# Patient Record
Sex: MALE | Race: WHITE | NOT HISPANIC OR LATINO | Employment: FULL TIME | URBAN - NONMETROPOLITAN AREA
[De-identification: names, ages, dates, MRNs, and addresses within clinical notes are randomized per-mention and may not be internally consistent; named-entity substitution may affect disease eponyms.]

---

## 2017-12-20 ENCOUNTER — OFFICE VISIT (OUTPATIENT)
Dept: RETAIL CLINIC | Facility: CLINIC | Age: 32
End: 2017-12-20

## 2017-12-20 VITALS
TEMPERATURE: 100.9 F | RESPIRATION RATE: 20 BRPM | DIASTOLIC BLOOD PRESSURE: 82 MMHG | OXYGEN SATURATION: 98 % | SYSTOLIC BLOOD PRESSURE: 136 MMHG | HEIGHT: 76 IN | WEIGHT: 315 LBS | HEART RATE: 92 BPM | BODY MASS INDEX: 38.36 KG/M2

## 2017-12-20 DIAGNOSIS — J10.1 INFLUENZA A: Primary | ICD-10-CM

## 2017-12-20 LAB
EXPIRATION DATE: ABNORMAL
FLUAV AG NPH QL: POSITIVE
FLUBV AG NPH QL: NEGATIVE
INTERNAL CONTROL: ABNORMAL
Lab: ABNORMAL

## 2017-12-20 PROCEDURE — 99213 OFFICE O/P EST LOW 20 MIN: CPT | Performed by: NURSE PRACTITIONER

## 2017-12-20 PROCEDURE — 87804 INFLUENZA ASSAY W/OPTIC: CPT | Performed by: NURSE PRACTITIONER

## 2017-12-20 RX ORDER — OSELTAMIVIR PHOSPHATE 75 MG/1
75 CAPSULE ORAL DAILY
Qty: 10 CAPSULE | Refills: 0 | Status: SHIPPED | OUTPATIENT
Start: 2017-12-20 | End: 2017-12-25

## 2017-12-20 NOTE — PATIENT INSTRUCTIONS
"Influenza, Adult  Influenza, more commonly known as \"the flu,\" is a viral infection that primarily affects the respiratory tract. The respiratory tract includes organs that help you breathe, such as the lungs, nose, and throat. The flu causes many common cold symptoms, as well as a high fever and body aches.  The flu spreads easily from person to person (is contagious). Getting a flu shot (influenza vaccination) every year is the best way to prevent influenza.  CAUSES  Influenza is caused by a virus. You can catch the virus by:  · Breathing in droplets from an infected person's cough or sneeze.  · Touching something that was recently contaminated with the virus and then touching your mouth, nose, or eyes.  RISK FACTORS  The following factors may make you more likely to get the flu:  · Not cleaning your hands frequently with soap and water or alcohol-based hand .  · Having close contact with many people during cold and flu season.  · Touching your mouth, eyes, or nose without washing or sanitizing your hands first.  · Not drinking enough fluids or not eating a healthy diet.  · Not getting enough sleep or exercise.  · Being under a high amount of stress.  · Not getting a yearly (annual) flu shot.  You may be at a higher risk of complications from the flu, such as a severe lung infection (pneumonia), if you:  · Are over the age of 65.  · Are pregnant.  · Have a weakened disease-fighting system (immune system). You may have a weakened immune system if you:    Have HIV or AIDS.    Are undergoing chemotherapy.    Are taking medicines that reduce the activity of (suppress) the immune system.  · Have a long-term (chronic) illness, such as heart disease, kidney disease, diabetes, or lung disease.  · Have a liver disorder.  · Are obese.  · Have anemia.  SYMPTOMS  Symptoms of this condition typically last 4-10 days and may include:  · Fever.  · Chills.  · Headache, body aches, or muscle aches.  · Sore " throat.  · Cough.  · Runny or congested nose.  · Chest discomfort and cough.  · Poor appetite.  · Weakness or tiredness (fatigue).  · Dizziness.  · Nausea or vomiting.  DIAGNOSIS  This condition may be diagnosed based on your medical history and a physical exam. Your health care provider may do a nose or throat swab test to confirm the diagnosis.  TREATMENT  If influenza is detected early, you can be treated with antiviral medicine that can reduce the length of your illness and the severity of your symptoms. This medicine may be given by mouth (orally) or through an IV tube that is inserted in one of your veins.  The goal of treatment is to relieve symptoms by taking care of yourself at home. This may include taking over-the-counter medicines, drinking plenty of fluids, and adding humidity to the air in your home.  In some cases, influenza goes away on its own. Severe influenza or complications from influenza may be treated in a hospital.  HOME CARE INSTRUCTIONS  · Take over-the-counter and prescription medicines only as told by your health care provider.  · Use a cool mist humidifier to add humidity to the air in your home. This can make breathing easier.  · Rest as needed.  · Drink enough fluid to keep your urine clear or pale yellow.  · Cover your mouth and nose when you cough or sneeze.  · Wash your hands with soap and water often, especially after you cough or sneeze. If soap and water are not available, use hand .  · Stay home from work or school as told by your health care provider. Unless you are visiting your health care provider, try to avoid leaving home until your fever has been gone for 24 hours without the use of medicine.  · Keep all follow-up visits as told by your health care provider. This is important.  PREVENTION  · Getting an annual flu shot is the best way to avoid getting the flu. You may get the flu shot in late summer, fall, or winter. Ask your health care provider when you should  get your flu shot.  · Wash your hands often or use hand  often.  · Avoid contact with people who are sick during cold and flu season.  · Eat a healthy diet, drink plenty of fluids, get enough sleep, and exercise regularly.  SEEK MEDICAL CARE IF:  · You develop new symptoms.  · You have:    Chest pain.    Diarrhea.    A fever.  · Your cough gets worse.  · You produce more mucus.  · You feel nauseous or you vomit.  SEEK IMMEDIATE MEDICAL CARE IF:  · You develop shortness of breath or difficulty breathing.  · Your skin or nails turn a bluish color.  · You have severe pain or stiffness in your neck.  · You develop a sudden headache or sudden pain in your face or ear.  · You cannot stop vomiting.     This information is not intended to replace advice given to you by your health care provider. Make sure you discuss any questions you have with your health care provider.     Document Released: 12/15/2001 Document Revised: 04/10/2017 Document Reviewed: 10/11/2016  [employer] Patient Education ©2017 [employer]    Discussed use of Tamiflu with him and likely he symptoms will resolve without treatment, but he has decided to take the Tamiflu. Symptoms treatment, Rest and fluids.     Follow up if symptoms worsen or persist

## 2017-12-20 NOTE — PROGRESS NOTES
Chief Complaint   Patient presents with   • URI     Subjective   Wyatt Acosta is a 32 y.o. male who presents to the clinic today with complaints flu like symptoms. He is weak and runny nose alternating with stopped up nose.   URI    This is a new problem. The current episode started yesterday. The problem has been gradually worsening. The maximum temperature recorded prior to his arrival was 100.4 - 100.9 F. Associated symptoms include congestion, coughing, headaches, joint pain, rhinorrhea and sinus pain. Pertinent negatives include no abdominal pain, chest pain, diarrhea, dysuria, ear pain, nausea, neck pain, plugged ear sensation, rash, sneezing, sore throat, swollen glands, vomiting or wheezing. The treatment provided mild relief.         Current Outpatient Prescriptions:   •  oseltamivir (TAMIFLU) 75 MG capsule, Take 1 capsule by mouth Daily for 5 days., Disp: 10 capsule, Rfl: 0    Allergies:  Review of patient's allergies indicates no known allergies.    Past Medical History:   Diagnosis Date   • MRSA (methicillin resistant Staphylococcus aureus)    • Otitis media      Past Surgical History:   Procedure Laterality Date   • INCISION AND DRAINAGE ARM     • TYMPANOSTOMY TUBE PLACEMENT       Family History   Problem Relation Age of Onset   • Stroke Mother    • Diabetes Father    • Stroke Sister      Social History   Substance Use Topics   • Smoking status: Never Smoker   • Smokeless tobacco: Never Used   • Alcohol use No       Review of Systems  Review of Systems   HENT: Positive for congestion, rhinorrhea and sinus pain. Negative for ear pain, sneezing and sore throat.    Respiratory: Positive for cough. Negative for wheezing.    Cardiovascular: Negative for chest pain.   Gastrointestinal: Negative for abdominal pain, diarrhea, nausea and vomiting.   Genitourinary: Negative for dysuria.   Musculoskeletal: Positive for joint pain. Negative for neck pain.   Skin: Negative for rash.   Neurological: Positive  "for headaches.       Objective   /82 (BP Location: Left arm, Patient Position: Sitting, Cuff Size: Adult)  Pulse 92  Temp (!) 100.9 °F (38.3 °C) (Tympanic)   Resp 20  Ht 193 cm (76\")  Wt (!) 168 kg (370 lb)  SpO2 98%  BMI 45.04 kg/m2      Physical Exam   Constitutional: He is oriented to person, place, and time. He appears well-developed and well-nourished.   HENT:   Head: Normocephalic and atraumatic.   Right Ear: Hearing, external ear and ear canal normal. Tympanic membrane is bulging.   Left Ear: External ear normal. Decreased hearing (cerumen impaction) is noted.   Nose: Mucosal edema and rhinorrhea present. Right sinus exhibits no maxillary sinus tenderness and no frontal sinus tenderness. Left sinus exhibits no maxillary sinus tenderness and no frontal sinus tenderness.   Mouth/Throat: Mucous membranes are normal. Posterior oropharyngeal erythema present. Tonsils are 1+ on the right. Tonsils are 1+ on the left. No tonsillar exudate.   Eyes: Pupils are equal, round, and reactive to light.   Neck: Normal range of motion. Neck supple.   Cardiovascular: Normal rate, regular rhythm and normal heart sounds.  Exam reveals no gallop and no friction rub.    No murmur heard.  Pulmonary/Chest: Effort normal and breath sounds normal. No stridor. No respiratory distress. He has no wheezes. He has no rales. He exhibits no tenderness.   Lymphadenopathy:     He has no cervical adenopathy.   Neurological: He is alert and oriented to person, place, and time.   Skin: Skin is warm and dry.   Psychiatric: He has a normal mood and affect. His behavior is normal.   Vitals reviewed.      Assessment/Plan     Wyatt was seen today for uri.    Diagnoses and all orders for this visit:    Influenza A  -     POCT Influenza A/B    Other orders  -     oseltamivir (TAMIFLU) 75 MG capsule; Take 1 capsule by mouth Daily for 5 days.    Discussed use of Tamiflu with him and likely he symptoms will resolve without treatment, but he has " decided to take the Tamiflu. Symptoms treatment, Rest and fluids.     Follow up if symptoms worsen or persist     May use OTC ceruminolytic for impaction or return here to have impaction removed.  He has had this done before for cerumen impaction.

## 2018-06-10 ENCOUNTER — APPOINTMENT (OUTPATIENT)
Dept: CT IMAGING | Facility: HOSPITAL | Age: 33
End: 2018-06-10

## 2018-06-10 ENCOUNTER — APPOINTMENT (OUTPATIENT)
Dept: GENERAL RADIOLOGY | Facility: HOSPITAL | Age: 33
End: 2018-06-10

## 2018-06-10 ENCOUNTER — HOSPITAL ENCOUNTER (INPATIENT)
Facility: HOSPITAL | Age: 33
LOS: 2 days | Discharge: HOME | End: 2018-06-12
Attending: FAMILY MEDICINE | Admitting: INTERNAL MEDICINE

## 2018-06-10 DIAGNOSIS — K04.7 DENTAL INFECTION: Primary | ICD-10-CM

## 2018-06-10 DIAGNOSIS — R65.10 SIRS (SYSTEMIC INFLAMMATORY RESPONSE SYNDROME) (HCC): ICD-10-CM

## 2018-06-10 LAB
ALBUMIN SERPL-MCNC: 3.7 G/DL (ref 3.5–5)
ALBUMIN/GLOB SERPL: 1.1 G/DL (ref 1.1–2.5)
ALP SERPL-CCNC: 109 U/L (ref 24–120)
ALT SERPL W P-5'-P-CCNC: 47 U/L (ref 0–54)
ANION GAP SERPL CALCULATED.3IONS-SCNC: 13 MMOL/L (ref 4–13)
AST SERPL-CCNC: 64 U/L (ref 7–45)
ATMOSPHERIC PRESS: 746 MMHG
BASE EXCESS BLDV CALC-SCNC: -2 MMOL/L (ref 0–2)
BASOPHILS # BLD AUTO: 0.02 10*3/MM3 (ref 0–0.2)
BASOPHILS NFR BLD AUTO: 0.2 % (ref 0–2)
BDY SITE: ABNORMAL
BILIRUB SERPL-MCNC: 1.3 MG/DL (ref 0.1–1)
BODY TEMPERATURE: 37 C
BUN BLD-MCNC: 12 MG/DL (ref 5–21)
BUN/CREAT SERPL: 21.8 (ref 7–25)
CALCIUM SPEC-SCNC: 8.7 MG/DL (ref 8.4–10.4)
CHLORIDE SERPL-SCNC: 96 MMOL/L (ref 98–110)
CK MB SERPL-CCNC: <0.22 NG/ML (ref 0–5)
CK SERPL-CCNC: 58 U/L (ref 0–203)
CO2 SERPL-SCNC: 22 MMOL/L (ref 24–31)
CREAT BLD-MCNC: 0.55 MG/DL (ref 0.5–1.4)
D DIMER PPP FEU-MCNC: 1.98 MG/L (FEU) (ref 0–0.5)
DEPRECATED RDW RBC AUTO: 33.3 FL (ref 40–54)
EOSINOPHIL # BLD AUTO: 0.01 10*3/MM3 (ref 0–0.7)
EOSINOPHIL NFR BLD AUTO: 0.1 % (ref 0–4)
ERYTHROCYTE [DISTWIDTH] IN BLOOD BY AUTOMATED COUNT: 12.6 % (ref 12–15)
GFR SERPL CREATININE-BSD FRML MDRD: >150 ML/MIN/1.73
GLOBULIN UR ELPH-MCNC: 3.4 GM/DL
GLUCOSE BLD-MCNC: 325 MG/DL (ref 70–100)
GLUCOSE BLDC GLUCOMTR-MCNC: 238 MG/DL (ref 70–130)
GLUCOSE BLDC GLUCOMTR-MCNC: 271 MG/DL (ref 70–130)
GLUCOSE BLDC GLUCOMTR-MCNC: 300 MG/DL (ref 70–130)
HCO3 BLDV-SCNC: 23.5 MMOL/L (ref 22–28)
HCT VFR BLD AUTO: 40 % (ref 40–52)
HGB BLD-MCNC: 14 G/DL (ref 14–18)
HOLD SPECIMEN: NORMAL
LYMPHOCYTES # BLD AUTO: 0.41 10*3/MM3 (ref 0.72–4.86)
LYMPHOCYTES NFR BLD AUTO: 4.4 % (ref 15–45)
Lab: ABNORMAL
MAGNESIUM SERPL-MCNC: 1.5 MG/DL (ref 1.4–2.2)
MCH RBC QN AUTO: 26 PG (ref 28–32)
MCHC RBC AUTO-ENTMCNC: 35 G/DL (ref 33–36)
MCV RBC AUTO: 74.3 FL (ref 82–95)
MODALITY: ABNORMAL
MONOCYTES # BLD AUTO: 0.67 10*3/MM3 (ref 0.19–1.3)
MONOCYTES NFR BLD AUTO: 7.2 % (ref 4–12)
MYOGLOBIN SERPL-MCNC: 19.3 NG/ML (ref 0–110)
NEUTROPHILS # BLD AUTO: 8.02 10*3/MM3 (ref 1.87–8.4)
NEUTROPHILS NFR BLD AUTO: 86.8 % (ref 39–78)
OSMOLALITY SERPL: 283 MOSM/KG (ref 289–308)
PCO2 BLDV: 41.9 MM HG (ref 41–51)
PH BLDV: 7.36 PH UNITS (ref 7.32–7.42)
PHOSPHATE SERPL-MCNC: 3.3 MG/DL (ref 2.5–4.5)
PLATELET # BLD AUTO: 170 10*3/MM3 (ref 130–400)
PMV BLD AUTO: 10.1 FL (ref 6–12)
PO2 BLDV: 36.8 MM HG (ref 27–53)
POTASSIUM BLD-SCNC: 3.6 MMOL/L (ref 3.5–5.3)
PROT SERPL-MCNC: 7.1 G/DL (ref 6.3–8.7)
RBC # BLD AUTO: 5.38 10*6/MM3 (ref 4.8–5.9)
SAO2 % BLDCOV: 68 % (ref 45–75)
SODIUM BLD-SCNC: 131 MMOL/L (ref 135–145)
T3FREE SERPL-MCNC: 3.97 PG/ML (ref 2.77–5.27)
T4 FREE SERPL-MCNC: 1.44 NG/DL (ref 0.78–2.19)
TROPONIN I SERPL-MCNC: <0.012 NG/ML (ref 0–0.03)
TSH SERPL DL<=0.05 MIU/L-ACNC: 0.71 MIU/ML (ref 0.47–4.68)
VENTILATOR MODE: ABNORMAL
WBC NRBC COR # BLD: 9.25 10*3/MM3 (ref 4.8–10.8)
WHOLE BLOOD HOLD SPECIMEN: NORMAL
WHOLE BLOOD HOLD SPECIMEN: NORMAL

## 2018-06-10 PROCEDURE — 84100 ASSAY OF PHOSPHORUS: CPT | Performed by: EMERGENCY MEDICINE

## 2018-06-10 PROCEDURE — 84443 ASSAY THYROID STIM HORMONE: CPT | Performed by: PHYSICIAN ASSISTANT

## 2018-06-10 PROCEDURE — 84439 ASSAY OF FREE THYROXINE: CPT | Performed by: PHYSICIAN ASSISTANT

## 2018-06-10 PROCEDURE — 82550 ASSAY OF CK (CPK): CPT | Performed by: PHYSICIAN ASSISTANT

## 2018-06-10 PROCEDURE — 25010000002 MORPHINE PER 10 MG: Performed by: EMERGENCY MEDICINE

## 2018-06-10 PROCEDURE — 85379 FIBRIN DEGRADATION QUANT: CPT | Performed by: PHYSICIAN ASSISTANT

## 2018-06-10 PROCEDURE — 82803 BLOOD GASES ANY COMBINATION: CPT

## 2018-06-10 PROCEDURE — 99285 EMERGENCY DEPT VISIT HI MDM: CPT

## 2018-06-10 PROCEDURE — 83874 ASSAY OF MYOGLOBIN: CPT | Performed by: PHYSICIAN ASSISTANT

## 2018-06-10 PROCEDURE — 84484 ASSAY OF TROPONIN QUANT: CPT | Performed by: PHYSICIAN ASSISTANT

## 2018-06-10 PROCEDURE — 71045 X-RAY EXAM CHEST 1 VIEW: CPT

## 2018-06-10 PROCEDURE — 0 IOPAMIDOL PER 1 ML: Performed by: PHYSICIAN ASSISTANT

## 2018-06-10 PROCEDURE — 82962 GLUCOSE BLOOD TEST: CPT

## 2018-06-10 PROCEDURE — 63710000001 INSULIN REGULAR HUMAN PER 5 UNITS: Performed by: PHYSICIAN ASSISTANT

## 2018-06-10 PROCEDURE — 85025 COMPLETE CBC W/AUTO DIFF WBC: CPT | Performed by: PHYSICIAN ASSISTANT

## 2018-06-10 PROCEDURE — 84481 FREE ASSAY (FT-3): CPT | Performed by: PHYSICIAN ASSISTANT

## 2018-06-10 PROCEDURE — 83735 ASSAY OF MAGNESIUM: CPT | Performed by: EMERGENCY MEDICINE

## 2018-06-10 PROCEDURE — 82553 CREATINE MB FRACTION: CPT | Performed by: PHYSICIAN ASSISTANT

## 2018-06-10 PROCEDURE — 80053 COMPREHEN METABOLIC PANEL: CPT | Performed by: PHYSICIAN ASSISTANT

## 2018-06-10 PROCEDURE — 25010000002 ONDANSETRON PER 1 MG: Performed by: EMERGENCY MEDICINE

## 2018-06-10 PROCEDURE — 93010 ELECTROCARDIOGRAM REPORT: CPT | Performed by: INTERNAL MEDICINE

## 2018-06-10 PROCEDURE — 93005 ELECTROCARDIOGRAM TRACING: CPT | Performed by: PHYSICIAN ASSISTANT

## 2018-06-10 PROCEDURE — 25010000002 FENTANYL CITRATE (PF) 100 MCG/2ML SOLUTION: Performed by: EMERGENCY MEDICINE

## 2018-06-10 PROCEDURE — 71275 CT ANGIOGRAPHY CHEST: CPT

## 2018-06-10 PROCEDURE — 83930 ASSAY OF BLOOD OSMOLALITY: CPT | Performed by: EMERGENCY MEDICINE

## 2018-06-10 RX ORDER — CLINDAMYCIN PHOSPHATE 600 MG/50ML
600 INJECTION INTRAVENOUS ONCE
Status: COMPLETED | OUTPATIENT
Start: 2018-06-10 | End: 2018-06-10

## 2018-06-10 RX ORDER — ONDANSETRON 2 MG/ML
4 INJECTION INTRAMUSCULAR; INTRAVENOUS ONCE
Status: COMPLETED | OUTPATIENT
Start: 2018-06-10 | End: 2018-06-10

## 2018-06-10 RX ORDER — MORPHINE SULFATE 10 MG/ML
6 INJECTION INTRAMUSCULAR; INTRAVENOUS; SUBCUTANEOUS ONCE
Status: COMPLETED | OUTPATIENT
Start: 2018-06-10 | End: 2018-06-10

## 2018-06-10 RX ORDER — FENTANYL CITRATE 50 UG/ML
75 INJECTION, SOLUTION INTRAMUSCULAR; INTRAVENOUS ONCE
Status: COMPLETED | OUTPATIENT
Start: 2018-06-10 | End: 2018-06-10

## 2018-06-10 RX ADMIN — SODIUM CHLORIDE 1000 ML: 9 INJECTION, SOLUTION INTRAVENOUS at 14:41

## 2018-06-10 RX ADMIN — IOPAMIDOL 200 ML: 755 INJECTION, SOLUTION INTRAVENOUS at 16:52

## 2018-06-10 RX ADMIN — CLINDAMYCIN PHOSPHATE 600 MG: 12 INJECTION, SOLUTION INTRAVENOUS at 14:49

## 2018-06-10 RX ADMIN — FENTANYL CITRATE 75 MCG: 50 INJECTION INTRAMUSCULAR; INTRAVENOUS at 17:53

## 2018-06-10 RX ADMIN — ONDANSETRON 4 MG: 2 INJECTION INTRAMUSCULAR; INTRAVENOUS at 20:16

## 2018-06-10 RX ADMIN — INSULIN HUMAN 5 UNITS: 100 INJECTION, SOLUTION PARENTERAL at 22:31

## 2018-06-10 RX ADMIN — SODIUM CHLORIDE 1000 ML: 9 INJECTION, SOLUTION INTRAVENOUS at 17:53

## 2018-06-10 RX ADMIN — SODIUM CHLORIDE 1000 ML: 9 INJECTION, SOLUTION INTRAVENOUS at 20:07

## 2018-06-10 RX ADMIN — MORPHINE SULFATE 6 MG: 10 INJECTION INTRAVENOUS at 20:16

## 2018-06-10 NOTE — ED NOTES
Tacos CASTELLANOS notified pt requesting something for pain     Donald Castro, MARQUISE  06/10/18 3188

## 2018-06-10 NOTE — ED PROVIDER NOTES
Subjective   32-year-old male presents with chief complaint of dental pain.  Patient is a type II diabetic who was diagnosed one year ago, was prescribed metformin but has not been taking it for 6 months.  The patient also notes 2 days ago while at work, he began experiencing chills and vomiting.  Patient reports he has had body aches and weakness ever since and had more episodes of vomiting last night.  The patient presents with continued pain and weakness.  He is unsure if he has had fevers as he has been very diaphoretic intermittently.  He has not tried any medication as an outpatient yet.  No diarrhea or cough or chest pain            Review of Systems   All other systems reviewed and are negative.      Past Medical History:   Diagnosis Date   • Diabetes mellitus    • MRSA (methicillin resistant Staphylococcus aureus)    • Otitis media        No Known Allergies    Past Surgical History:   Procedure Laterality Date   • INCISION AND DRAINAGE ARM     • TONSILLECTOMY     • TYMPANOSTOMY TUBE PLACEMENT         Family History   Problem Relation Age of Onset   • Stroke Mother    • Diabetes Father    • Stroke Sister    • No Known Problems Brother    • No Known Problems Sister        Social History     Social History   • Marital status: Single     Social History Main Topics   • Smoking status: Never Smoker   • Smokeless tobacco: Never Used   • Alcohol use No   • Drug use: No   • Sexual activity: Defer     Other Topics Concern   • Not on file           Objective   Physical Exam   Constitutional: He is oriented to person, place, and time. He appears distressed.   HENT:   Head: Normocephalic and atraumatic.   Poor dentition throughout   Eyes: EOM are normal. Pupils are equal, round, and reactive to light.   Neck: Normal range of motion. Neck supple.   Cardiovascular: Tachycardia present.  Exam reveals no gallop and no friction rub.    No murmur heard.  Pulmonary/Chest: Effort normal and breath sounds normal.   Abdominal:  Soft. Bowel sounds are normal. He exhibits no distension. There is no tenderness.   Musculoskeletal: Normal range of motion.   Lymphadenopathy:     He has no cervical adenopathy.   Neurological: He is alert and oriented to person, place, and time. No cranial nerve deficit. Coordination normal.   Skin: Skin is warm. He is diaphoretic.   Psychiatric: He has a normal mood and affect. His behavior is normal.   Vitals reviewed.      Procedures           ED Course  ED Course as of Jun 11 0250   Sun Marcial 10, 2018   2237 SpO2: 97 % [LM]      ED Course User Index  [LM] Tacos Quesada PA-C                  [facility]  Number of Diagnoses or Management Options  Diagnosis management comments: Patient has a known source of infection.  Patient has documented tachypnea and appears distressed on exam.  Patient was ambulated during his visit here and his heart rate was 143 and the patient reported symptoms of weakness and dizziness.  The patient is diaphoretic.  We have given him clindamycin.  We have given him 3 L liters of fluid.  His heart rate is still in the 120s.  We will admit to hospitalists for further evaluation.       Amount and/or Complexity of Data Reviewed  Clinical lab tests: reviewed and ordered  Tests in the radiology section of CPT®: ordered and reviewed  Tests in the medicine section of CPT®: ordered and reviewed    Risk of Complications, Morbidity, and/or Mortality  Presenting problems: moderate  Diagnostic procedures: moderate  Management options: moderate    Patient Progress  Patient progress: stable        Final diagnoses:   Dental infection   SIRS (systemic inflammatory response syndrome)            Tacos Quesada PA-C  06/11/18 0250

## 2018-06-11 ENCOUNTER — APPOINTMENT (OUTPATIENT)
Dept: CT IMAGING | Facility: HOSPITAL | Age: 33
End: 2018-06-11

## 2018-06-11 LAB
ALBUMIN SERPL-MCNC: 3.2 G/DL (ref 3.5–5)
ALBUMIN/GLOB SERPL: 1.1 G/DL (ref 1.1–2.5)
ALP SERPL-CCNC: 76 U/L (ref 24–120)
ALT SERPL W P-5'-P-CCNC: 37 U/L (ref 0–54)
AMPHET+METHAMPHET UR QL: NEGATIVE
ANION GAP SERPL CALCULATED.3IONS-SCNC: 10 MMOL/L (ref 4–13)
ARTICHOKE IGE QN: 55 MG/DL (ref 0–99)
AST SERPL-CCNC: 38 U/L (ref 7–45)
BACTERIA UR QL AUTO: ABNORMAL /HPF
BARBITURATES UR QL SCN: NEGATIVE
BASOPHILS # BLD AUTO: 0.02 10*3/MM3 (ref 0–0.2)
BASOPHILS NFR BLD AUTO: 0.2 % (ref 0–2)
BENZODIAZ UR QL SCN: NEGATIVE
BILIRUB SERPL-MCNC: 1 MG/DL (ref 0.1–1)
BILIRUB UR QL STRIP: NEGATIVE
BUN BLD-MCNC: 5 MG/DL (ref 5–21)
BUN/CREAT SERPL: 8.3 (ref 7–25)
CALCIUM SPEC-SCNC: 7.9 MG/DL (ref 8.4–10.4)
CANNABINOIDS SERPL QL: NEGATIVE
CHLORIDE SERPL-SCNC: 98 MMOL/L (ref 98–110)
CHOLEST SERPL-MCNC: 108 MG/DL (ref 130–200)
CLARITY UR: CLEAR
CO2 SERPL-SCNC: 25 MMOL/L (ref 24–31)
COCAINE UR QL: NEGATIVE
COLOR UR: YELLOW
CREAT BLD-MCNC: 0.6 MG/DL (ref 0.5–1.4)
D-LACTATE SERPL-SCNC: 1 MMOL/L (ref 0.5–2)
D-LACTATE SERPL-SCNC: 1.3 MMOL/L (ref 0.5–2)
DEPRECATED RDW RBC AUTO: 35.1 FL (ref 40–54)
EOSINOPHIL # BLD AUTO: 0.01 10*3/MM3 (ref 0–0.7)
EOSINOPHIL NFR BLD AUTO: 0.1 % (ref 0–4)
ERYTHROCYTE [DISTWIDTH] IN BLOOD BY AUTOMATED COUNT: 12.9 % (ref 12–15)
GFR SERPL CREATININE-BSD FRML MDRD: >150 ML/MIN/1.73
GLOBULIN UR ELPH-MCNC: 3 GM/DL
GLUCOSE BLD-MCNC: 236 MG/DL (ref 70–100)
GLUCOSE BLDC GLUCOMTR-MCNC: 210 MG/DL (ref 70–130)
GLUCOSE BLDC GLUCOMTR-MCNC: 241 MG/DL (ref 70–130)
GLUCOSE BLDC GLUCOMTR-MCNC: 269 MG/DL (ref 70–130)
GLUCOSE BLDC GLUCOMTR-MCNC: 282 MG/DL (ref 70–130)
GLUCOSE UR STRIP-MCNC: ABNORMAL MG/DL
HBA1C MFR BLD: 9.9 %
HCT VFR BLD AUTO: 35.6 % (ref 40–52)
HDLC SERPL-MCNC: 17 MG/DL
HGB BLD-MCNC: 12.2 G/DL (ref 14–18)
HGB UR QL STRIP.AUTO: NEGATIVE
HYALINE CASTS UR QL AUTO: ABNORMAL /LPF
IMM GRANULOCYTES # BLD: 0.09 10*3/MM3 (ref 0–0.03)
IMM GRANULOCYTES NFR BLD: 1 % (ref 0–5)
KETONES UR QL STRIP: ABNORMAL
LDLC/HDLC SERPL: 2.92 {RATIO}
LEUKOCYTE ESTERASE UR QL STRIP.AUTO: NEGATIVE
LYMPHOCYTES # BLD AUTO: 1.53 10*3/MM3 (ref 0.72–4.86)
LYMPHOCYTES NFR BLD AUTO: 16.9 % (ref 15–45)
MAGNESIUM SERPL-MCNC: 1.7 MG/DL (ref 1.4–2.2)
MCH RBC QN AUTO: 25.8 PG (ref 28–32)
MCHC RBC AUTO-ENTMCNC: 34.3 G/DL (ref 33–36)
MCV RBC AUTO: 75.4 FL (ref 82–95)
METHADONE UR QL SCN: NEGATIVE
MONOCYTES # BLD AUTO: 0.78 10*3/MM3 (ref 0.19–1.3)
MONOCYTES NFR BLD AUTO: 8.6 % (ref 4–12)
NEUTROPHILS # BLD AUTO: 6.64 10*3/MM3 (ref 1.87–8.4)
NEUTROPHILS NFR BLD AUTO: 73.2 % (ref 39–78)
NITRITE UR QL STRIP: NEGATIVE
NRBC BLD MANUAL-RTO: 0 /100 WBC (ref 0–0)
OPIATES UR QL: POSITIVE
OSMOLALITY SERPL: 277 MOSM/KG (ref 289–308)
OSMOLALITY UR: 379 MOSM/KG (ref 601–850)
PCP UR QL SCN: NEGATIVE
PH UR STRIP.AUTO: 5.5 [PH] (ref 5–8)
PHOSPHATE SERPL-MCNC: 3.6 MG/DL (ref 2.5–4.5)
PLATELET # BLD AUTO: 173 10*3/MM3 (ref 130–400)
PMV BLD AUTO: 10.6 FL (ref 6–12)
POTASSIUM BLD-SCNC: 3.7 MMOL/L (ref 3.5–5.3)
PROT SERPL-MCNC: 6.2 G/DL (ref 6.3–8.7)
PROT UR QL STRIP: ABNORMAL
RBC # BLD AUTO: 4.72 10*6/MM3 (ref 4.8–5.9)
RBC # UR: ABNORMAL /HPF
REF LAB TEST METHOD: ABNORMAL
SODIUM BLD-SCNC: 133 MMOL/L (ref 135–145)
SODIUM UR-SCNC: 49 MMOL/L (ref 30–90)
SP GR UR STRIP: 1.03 (ref 1–1.03)
SQUAMOUS #/AREA URNS HPF: ABNORMAL /HPF
T4 FREE SERPL-MCNC: 1.44 NG/DL (ref 0.78–2.19)
TRIGL SERPL-MCNC: 207 MG/DL (ref 0–149)
TROPONIN I SERPL-MCNC: <0.012 NG/ML (ref 0–0.03)
TSH SERPL DL<=0.05 MIU/L-ACNC: 0.82 MIU/ML (ref 0.47–4.68)
UROBILINOGEN UR QL STRIP: ABNORMAL
WBC NRBC COR # BLD: 9.07 10*3/MM3 (ref 4.8–10.8)
WBC UR QL AUTO: ABNORMAL /HPF

## 2018-06-11 PROCEDURE — 83935 ASSAY OF URINE OSMOLALITY: CPT | Performed by: FAMILY MEDICINE

## 2018-06-11 PROCEDURE — 85025 COMPLETE CBC W/AUTO DIFF WBC: CPT | Performed by: FAMILY MEDICINE

## 2018-06-11 PROCEDURE — 82962 GLUCOSE BLOOD TEST: CPT

## 2018-06-11 PROCEDURE — 80061 LIPID PANEL: CPT | Performed by: FAMILY MEDICINE

## 2018-06-11 PROCEDURE — 25010000002 ONDANSETRON PER 1 MG: Performed by: EMERGENCY MEDICINE

## 2018-06-11 PROCEDURE — 94760 N-INVAS EAR/PLS OXIMETRY 1: CPT

## 2018-06-11 PROCEDURE — 84300 ASSAY OF URINE SODIUM: CPT | Performed by: FAMILY MEDICINE

## 2018-06-11 PROCEDURE — 83036 HEMOGLOBIN GLYCOSYLATED A1C: CPT | Performed by: FAMILY MEDICINE

## 2018-06-11 PROCEDURE — 80053 COMPREHEN METABOLIC PANEL: CPT | Performed by: FAMILY MEDICINE

## 2018-06-11 PROCEDURE — 94799 UNLISTED PULMONARY SVC/PX: CPT

## 2018-06-11 PROCEDURE — 25010000002 MORPHINE PER 10 MG: Performed by: EMERGENCY MEDICINE

## 2018-06-11 PROCEDURE — 80307 DRUG TEST PRSMV CHEM ANLYZR: CPT | Performed by: PHYSICIAN ASSISTANT

## 2018-06-11 PROCEDURE — 83735 ASSAY OF MAGNESIUM: CPT | Performed by: FAMILY MEDICINE

## 2018-06-11 PROCEDURE — 25010000002 IOPAMIDOL 61 % SOLUTION: Performed by: INTERNAL MEDICINE

## 2018-06-11 PROCEDURE — 83605 ASSAY OF LACTIC ACID: CPT | Performed by: FAMILY MEDICINE

## 2018-06-11 PROCEDURE — 87040 BLOOD CULTURE FOR BACTERIA: CPT | Performed by: PHYSICIAN ASSISTANT

## 2018-06-11 PROCEDURE — 25010000002 ENOXAPARIN PER 10 MG: Performed by: FAMILY MEDICINE

## 2018-06-11 PROCEDURE — 63710000001 INSULIN LISPRO (HUMAN) PER 5 UNITS: Performed by: FAMILY MEDICINE

## 2018-06-11 PROCEDURE — 81001 URINALYSIS AUTO W/SCOPE: CPT | Performed by: PHYSICIAN ASSISTANT

## 2018-06-11 PROCEDURE — 84484 ASSAY OF TROPONIN QUANT: CPT | Performed by: FAMILY MEDICINE

## 2018-06-11 PROCEDURE — 87040 BLOOD CULTURE FOR BACTERIA: CPT | Performed by: FAMILY MEDICINE

## 2018-06-11 PROCEDURE — 84439 ASSAY OF FREE THYROXINE: CPT | Performed by: FAMILY MEDICINE

## 2018-06-11 PROCEDURE — 25810000003 SODIUM CHLORIDE 0.9 % WITH KCL 20 MEQ 20-0.9 MEQ/L-% SOLUTION: Performed by: NURSE PRACTITIONER

## 2018-06-11 PROCEDURE — 25010000002 METHYLPREDNISOLONE PER 125 MG: Performed by: NURSE PRACTITIONER

## 2018-06-11 PROCEDURE — 83930 ASSAY OF BLOOD OSMOLALITY: CPT | Performed by: FAMILY MEDICINE

## 2018-06-11 PROCEDURE — 83605 ASSAY OF LACTIC ACID: CPT | Performed by: PHYSICIAN ASSISTANT

## 2018-06-11 PROCEDURE — 84443 ASSAY THYROID STIM HORMONE: CPT | Performed by: FAMILY MEDICINE

## 2018-06-11 PROCEDURE — 70488 CT MAXILLOFACIAL W/O & W/DYE: CPT

## 2018-06-11 PROCEDURE — 84100 ASSAY OF PHOSPHORUS: CPT | Performed by: FAMILY MEDICINE

## 2018-06-11 RX ORDER — SODIUM CHLORIDE 0.9 % (FLUSH) 0.9 %
1-10 SYRINGE (ML) INJECTION AS NEEDED
Status: DISCONTINUED | OUTPATIENT
Start: 2018-06-11 | End: 2018-06-12 | Stop reason: HOSPADM

## 2018-06-11 RX ORDER — MEPERIDINE HYDROCHLORIDE 50 MG/ML
50 INJECTION INTRAMUSCULAR; INTRAVENOUS; SUBCUTANEOUS
Status: DISCONTINUED | OUTPATIENT
Start: 2018-06-11 | End: 2018-06-12 | Stop reason: HOSPADM

## 2018-06-11 RX ORDER — NICOTINE POLACRILEX 4 MG
15 LOZENGE BUCCAL
Status: DISCONTINUED | OUTPATIENT
Start: 2018-06-11 | End: 2018-06-12 | Stop reason: HOSPADM

## 2018-06-11 RX ORDER — ONDANSETRON 2 MG/ML
4 INJECTION INTRAMUSCULAR; INTRAVENOUS EVERY 6 HOURS PRN
Status: DISCONTINUED | OUTPATIENT
Start: 2018-06-11 | End: 2018-06-12 | Stop reason: HOSPADM

## 2018-06-11 RX ORDER — MORPHINE SULFATE 4 MG/ML
4 INJECTION, SOLUTION INTRAMUSCULAR; INTRAVENOUS ONCE
Status: COMPLETED | OUTPATIENT
Start: 2018-06-11 | End: 2018-06-11

## 2018-06-11 RX ORDER — HYDROCODONE BITARTRATE AND ACETAMINOPHEN 5; 325 MG/1; MG/1
1 TABLET ORAL EVERY 6 HOURS PRN
Status: DISCONTINUED | OUTPATIENT
Start: 2018-06-11 | End: 2018-06-12 | Stop reason: HOSPADM

## 2018-06-11 RX ORDER — DEXTROSE MONOHYDRATE 25 G/50ML
25 INJECTION, SOLUTION INTRAVENOUS
Status: DISCONTINUED | OUTPATIENT
Start: 2018-06-11 | End: 2018-06-12 | Stop reason: HOSPADM

## 2018-06-11 RX ORDER — SODIUM CHLORIDE 9 MG/ML
125 INJECTION, SOLUTION INTRAVENOUS CONTINUOUS
Status: DISCONTINUED | OUTPATIENT
Start: 2018-06-11 | End: 2018-06-11

## 2018-06-11 RX ORDER — CLINDAMYCIN PHOSPHATE 600 MG/50ML
600 INJECTION INTRAVENOUS EVERY 6 HOURS
Status: DISCONTINUED | OUTPATIENT
Start: 2018-06-11 | End: 2018-06-12

## 2018-06-11 RX ORDER — FAMOTIDINE 20 MG/1
40 TABLET, FILM COATED ORAL DAILY
Status: DISCONTINUED | OUTPATIENT
Start: 2018-06-11 | End: 2018-06-12 | Stop reason: HOSPADM

## 2018-06-11 RX ORDER — METHYLPREDNISOLONE SODIUM SUCCINATE 125 MG/2ML
60 INJECTION, POWDER, LYOPHILIZED, FOR SOLUTION INTRAMUSCULAR; INTRAVENOUS ONCE
Status: COMPLETED | OUTPATIENT
Start: 2018-06-11 | End: 2018-06-11

## 2018-06-11 RX ORDER — ONDANSETRON 2 MG/ML
4 INJECTION INTRAMUSCULAR; INTRAVENOUS ONCE
Status: COMPLETED | OUTPATIENT
Start: 2018-06-11 | End: 2018-06-11

## 2018-06-11 RX ORDER — SODIUM CHLORIDE AND POTASSIUM CHLORIDE 150; 900 MG/100ML; MG/100ML
75 INJECTION, SOLUTION INTRAVENOUS CONTINUOUS
Status: DISCONTINUED | OUTPATIENT
Start: 2018-06-11 | End: 2018-06-12 | Stop reason: HOSPADM

## 2018-06-11 RX ADMIN — SODIUM CHLORIDE 125 ML/HR: 9 INJECTION, SOLUTION INTRAVENOUS at 02:00

## 2018-06-11 RX ADMIN — ENOXAPARIN SODIUM 40 MG: 40 INJECTION SUBCUTANEOUS at 17:27

## 2018-06-11 RX ADMIN — INSULIN LISPRO 4 UNITS: 100 INJECTION, SOLUTION INTRAVENOUS; SUBCUTANEOUS at 20:45

## 2018-06-11 RX ADMIN — CLINDAMYCIN PHOSPHATE 600 MG: 12 INJECTION, SOLUTION INTRAVENOUS at 03:22

## 2018-06-11 RX ADMIN — FAMOTIDINE 40 MG: 20 TABLET, FILM COATED ORAL at 09:37

## 2018-06-11 RX ADMIN — MORPHINE SULFATE 4 MG: 4 INJECTION INTRAVENOUS at 00:38

## 2018-06-11 RX ADMIN — INSULIN LISPRO 3 UNITS: 100 INJECTION, SOLUTION INTRAVENOUS; SUBCUTANEOUS at 17:27

## 2018-06-11 RX ADMIN — CLINDAMYCIN PHOSPHATE 600 MG: 12 INJECTION, SOLUTION INTRAVENOUS at 20:46

## 2018-06-11 RX ADMIN — POTASSIUM CHLORIDE AND SODIUM CHLORIDE 75 ML/HR: 900; 150 INJECTION, SOLUTION INTRAVENOUS at 09:37

## 2018-06-11 RX ADMIN — CLINDAMYCIN PHOSPHATE 600 MG: 12 INJECTION, SOLUTION INTRAVENOUS at 09:36

## 2018-06-11 RX ADMIN — ONDANSETRON 4 MG: 2 INJECTION, SOLUTION INTRAMUSCULAR; INTRAVENOUS at 00:38

## 2018-06-11 RX ADMIN — MEPERIDINE HYDROCHLORIDE 50 MG: 50 INJECTION INTRAMUSCULAR; INTRAVENOUS; SUBCUTANEOUS at 06:35

## 2018-06-11 RX ADMIN — INSULIN LISPRO 3 UNITS: 100 INJECTION, SOLUTION INTRAVENOUS; SUBCUTANEOUS at 12:52

## 2018-06-11 RX ADMIN — CLINDAMYCIN PHOSPHATE 600 MG: 12 INJECTION, SOLUTION INTRAVENOUS at 14:50

## 2018-06-11 RX ADMIN — MEPERIDINE HYDROCHLORIDE 50 MG: 50 INJECTION INTRAMUSCULAR; INTRAVENOUS; SUBCUTANEOUS at 09:37

## 2018-06-11 RX ADMIN — MEPERIDINE HYDROCHLORIDE 50 MG: 50 INJECTION INTRAMUSCULAR; INTRAVENOUS; SUBCUTANEOUS at 12:56

## 2018-06-11 RX ADMIN — INSULIN LISPRO 4 UNITS: 100 INJECTION, SOLUTION INTRAVENOUS; SUBCUTANEOUS at 09:36

## 2018-06-11 RX ADMIN — HYDROCODONE BITARTRATE AND ACETAMINOPHEN 1 TABLET: 5; 325 TABLET ORAL at 17:27

## 2018-06-11 RX ADMIN — MEPERIDINE HYDROCHLORIDE 50 MG: 50 INJECTION INTRAMUSCULAR; INTRAVENOUS; SUBCUTANEOUS at 02:48

## 2018-06-11 RX ADMIN — METHYLPREDNISOLONE SODIUM SUCCINATE 60 MG: 125 INJECTION, POWDER, FOR SOLUTION INTRAMUSCULAR; INTRAVENOUS at 17:28

## 2018-06-11 RX ADMIN — IOPAMIDOL 100 ML: 612 INJECTION, SOLUTION INTRAVENOUS at 11:15

## 2018-06-11 RX ADMIN — ENOXAPARIN SODIUM 40 MG: 40 INJECTION SUBCUTANEOUS at 06:35

## 2018-06-11 NOTE — ED NOTES
Tacos CASTELLANOS notified pt requesting something for pain     Donald Castro, RN  06/11/18 0034

## 2018-06-11 NOTE — PROGRESS NOTES
"Pharmacy Dosing Service  Anticoagulant  Enoxaparin    Assessment/Action/Plan:  Lovenox 40 mg SQ every 24 hours for VTE Prophylaxis has been adjusted to Lovenox 40 mg SQ every 12 hours for patient with BMI greater than 40.      Subjective:  Wyatt Acosta is a 32 y.o. male on Enoxaparin 40 mg SQ every 12 hours for indication of VTE prophylaxis.  Objective:  [Ht: 195.6 cm (77\"); Wt: (!) 161 kg (354 lb 5 oz); BMI: Body mass index is 42.02 kg/m².]  Estimated Creatinine Clearance: 324.8 mL/min (by C-G formula based on SCr of 0.55 mg/dL). No results found for: INR, PROTIME   Lab Results   Component Value Date    HGB 14.0 06/10/2018      Lab Results   Component Value Date     06/10/2018       Tacho [facility]  06/11/18 2:37 AM     "

## 2018-06-11 NOTE — ED NOTES
Patient ambulated down hallway per Tacos CASTELLANOS order. Patient heart rate elevated to 143 bpm. Patient reports feeling light headed.      Olesya High RN  06/10/18 2122

## 2018-06-11 NOTE — PROGRESS NOTES
[facility] Medicine Services  INPATIENT PROGRESS NOTE    Length of Stay: 1  Date of Admission: 6/10/2018  Primary Care Physician: Fabio Pan MD    Subjective   Chief Complaint: left facial swelling  HPI   Reports left facial swelling that started Friday associated with subjective fever and chills, nausea and vomiting ×2.  Has continued to have facial swelling.  Has used ibuprofen without improvement in symptoms.  He denies any specific tooth pain.  He was noted be tachycardic 120-150 in the ER.  Glucoses elevated 238 270.  He was placed on metformin one year ago but has not taken in over 6 months.  He has not followed regularly by primary care provider.  He denies chest pain or palpitations.  He received clindamycin, normal saline fluid bolus, regular insulin, morphine and Zofran in the emergency room.    Lying in bed.  No family present.  Mild left facial swelling.  Tender to touch.  Poor dental dilatation.  No visible abscess noted and gum.  He complains of facial and cheek bone pain.  Mild nausea.  No vomiting.    Review of Systems   Constitutional: Positive for chills and fever.   HENT: Positive for dental problem and facial swelling (Left). Negative for congestion.    Eyes: Negative for photophobia and visual disturbance.   Respiratory: Negative for shortness of breath and wheezing.    Cardiovascular: Negative for chest pain, palpitations and leg swelling.   Gastrointestinal: Positive for nausea and vomiting. Negative for abdominal distention, abdominal pain and diarrhea.   Endocrine: Negative for cold intolerance, heat intolerance and polyuria.   Genitourinary: Negative for dysuria, hematuria and urgency.   Musculoskeletal: Positive for neck pain.   Skin: Negative for wound.   Allergic/Immunologic: Negative for immunocompromised state.   Neurological: Positive for weakness.   Hematological: Negative for adenopathy. Does not bruise/bleed easily.   Psychiatric/Behavioral:  Negative for agitation, behavioral problems and confusion.      All pertinent negatives and positives are as above. All other systems have been reviewed and are negative unless otherwise stated.     Objective    Temp:  [97.7 °F (36.5 °C)-100.7 °F (38.2 °C)] 97.9 °F (36.6 °C)  Heart Rate:  [] 97  Resp:  [18-26] 20  BP: (124-162)/(39-80) 149/66  Physical Exam   Constitutional: He is oriented to person, place, and time. He appears well-developed and well-nourished.   HENT:   Head: Normocephalic and atraumatic.   Mild left facial swelling with erythema   Eyes: EOM are normal. Pupils are equal, round, and reactive to light.   Neck: Normal range of motion. Neck supple.   Cardiovascular: Regular rhythm, normal heart sounds and intact distal pulses.  Exam reveals no gallop and no friction rub.    No murmur heard.  Sinus tach 100-112 on telemetry   Pulmonary/Chest: Effort normal and breath sounds normal. No respiratory distress. He has no wheezes. He has no rales.   Abdominal: Soft. Bowel sounds are normal. He exhibits no distension. There is no tenderness.   Genitourinary:   Genitourinary Comments: Voiding   Musculoskeletal: He exhibits edema (Left facial).   Neurological: He is alert and oriented to person, place, and time.   Skin: Skin is warm and dry. There is erythema (Mild erythema left facial region).   Psychiatric: He has a normal mood and affect. His behavior is normal. Judgment and thought content normal.     Results Review:  I have reviewed the labs, radiology results, and diagnostic studies.    Laboratory Data:     Results from last 7 days  Lab Units 06/11/18  0601 06/10/18  1440   WBC 10*3/mm3 9.07 9.25   HEMOGLOBIN g/dL 12.2* 14.0   HEMATOCRIT % 35.6* 40.0   PLATELETS 10*3/mm3 173 170          Results from last 7 days  Lab Units 06/11/18  0601 06/10/18  1440   SODIUM mmol/L 133* 131*   POTASSIUM mmol/L 3.7 3.6   CHLORIDE mmol/L 98 96*   CO2 mmol/L 25.0 22.0*   BUN mg/dL 5 12   CREATININE mg/dL 0.60 0.55    CALCIUM mg/dL 7.9* 8.7   BILIRUBIN mg/dL 1.0 1.3*   ALK PHOS U/L 76 109   ALT (SGPT) U/L 37 47   AST (SGOT) U/L 38 64*   GLUCOSE mg/dL 236* 325*        Imaging Results (all)     Procedure Component Value Units Date/Time    CT Angiogram Chest With Contrast [[id number]] Collected:  06/10/18 1708     Updated:  06/10/18 1717    Narrative:       CT ANGIOGRAM CHEST W CONTRAST- 6/10/2018 4:47 PM CDT      HISTORY: soa, tachycardia      COMPARISON: Chest x-ray dated 6/10/2018.      DOSE LENGTH PRODUCT: 1076 mGy cm. Automated exposure control was also  utilized to decrease patient radiation dose.     TECHNIQUE: Helical tomographic images of the chest were obtained after  the administration of intravenous contrast following angiogram protocol.  Additionally, 3D and multiplanar reformatted images were provided.        FINDINGS:    Pulmonary arteries: There is adequate enhancement of the pulmonary  arteries to evaluate for central and segmental pulmonary emboli. There  are no filling defects within the main, lobar, segmental or visualized  subsegmental pulmonary arteries. The pulmonary arteries are within  normal limits for size.      Aorta and great vessels: The aorta is well opacified and demonstrates no  dissection or aneurysm. The great vessels are normal in appearance.     Visualized neck base: The imaged portion of the base of the neck appears  unremarkable.      Lungs: No dense areas of lung consolidation are identified. There is a  very mild groundglass attenuation which is seen most prominently in the  dependent areas of the lungs, especially the bilateral lower lobes. No  mass or suspicious pulmonary nodule identified. The trachea and  bronchial tree are patent.      Heart: The heart is normal in size. There is no pericardial effusion.      Mediastinum and lymph nodes: Slightly prominent lymph nodes are seen in  both nader, the largest of which is seen in the left hilum measuring up  to 11 mm short axis (series 6-image  57). Additionally, there is mild  subcarinal and right level 2 adenopathy, the appearance of which is  nonspecific.      Skeletal and soft tissues: The osseous structures of the thorax and  surrounding soft tissues demonstrate no acute process.     Upper abdomen: The imaged portion of the upper abdomen demonstrates no  acute process. Hepatic steatosis.       Impression:       1. No evidence of pulmonary embolus.  2. No dense area of lung consolidation to suggest typical pneumonia.  3. There are subtle areas of groundglass attenuation in both lungs which  could reflect an underlying bronchiolitis or could perhaps be secondary  to atelectasis. Additionally, mild hilar and mediastinal adenopathy are  seen which are favored to be reactive.  This report was finalized on 06/10/2018 17:14 by Dr Gurvinder Tao, .    XR Chest 1 View [[id number]] Collected:  06/10/18 1451     Updated:  06/10/18 1455    Narrative:       EXAMINATION: XR CHEST 1 VW-. 6/10/2018 2:51 PM CDT     CHEST, ONE VIEW:     HISTORY: Tachycardia     COMPARISON: 10/30/2012     A single frontal chest radiograph was obtained.     FINDINGS: The level of inspiration is relatively shallow and lung  volumes mildly diminished.     The lungs are clear without infiltrates.     The heart is normal in size, pulmonary circulation appropriate, without  heart failure.     The bony structures are intact.     Radiographically, chest is unchanged.                                     Impression:       1. No acute cardiopulmonary process.     This report was finalized on 06/10/2018 14:52 by Dr. Griffin Lopez MD.          Intake/Output    Intake/Output Summary (Last 24 hours) at 06/11/18 0756  Last data filed at 06/11/18 0200   Gross per 24 hour   Intake                0 ml   Output             2675 ml   Net            -2675 ml       Scheduled Meds    clindamycin 600 mg Intravenous Q6H   enoxaparin 40 mg Subcutaneous Q12H   famotidine 40 mg Oral Daily   insulin lispro 2-7 Units  Subcutaneous 4x Daily With Meals & Nightly       I have reviewed the patient current medications.     Assessment/Plan     Hospital Problem List     Dental infection        Assessment:  1.  Left facial swelling  2.  Suspect dental infection  3.  Diabetes mellitus type II with hyperglycemia  A1c 9.9 (6/11/18)  4.  Hyponatremia, improving  5.  Sinus tachycardia  6.  Systemic inflammatory response syndrome    Plan:  1.  CT facial bones with without contrast.  Discussed with radiology and this will assess oh bone and soft tissue  2.  Clindamycin 600 mg IV every 6 hours  3.  Normal saline at 125 mL per hour.  Will decrease to 75 mL per hour and add 20 mEq of potassium per liter  4.  Accu-Cheks before meals and at bedtime with sliding scale insulin coverage.  He had been diagnosed as diabetic one year ago.  However, he has not taken metformin in at least 6 months.  Hold metformin at present as patient is having CT contrast  5.  Lovenox for deep vein thrombosis prophylaxis  6.  Check A1c 9.9  7.  Basic metabolic panel tomorrow to monitor hyponatremia    The above documentation resulted from a face-to-face encounter by me Joselyn AVILA, [facility].      Discharge Planning: I expect patient to be discharged to home in 1-2 days.    MARGARITA Melgoza   06/11/18   7:56 AM    I personally evaluated and examined the patient in conjunction with MARGARITA Alves and agree with the assessment, treatment plan, and disposition of the patient as recorded by her. My history, exam, and further recommendations are:     32-year-old  man who has known history of diabetes presenting with dental pain and nausea.  On exam I noticed that he has some swelling on his left side of his face.  He underwent CT of the facial bones that revealed mild edema in the left base adjacent to the left maxilla.  There is lucency surrounding the left maxillary lateral incisor tooth root that could represent a tooth root abscess.Lucency around the  tooth roots of first molar in the right mandible  that could represent periapical abscess.  3. Dental caries.  4. Mass effect at the tongue base in the midline most likely represents  hypertrophic lingual tonsillar tissue. There are small lymph nodes in  the neck bilaterally. Lymph nodes are probably reactive  Physical exam correlates with above findings.  Vitals:    06/11/18 1608   BP: 116/58   Pulse: 98   Resp: 18   Temp: 98.4 °F (36.9 °C)   SpO2: 95%     White count is normal at 9.07, hemoglobin 12.2, platelet of 173; BUN 5, creatinine 0.60, glucose 236, serum sodium 133, potassium 3.7  HemoGlobin A1c is 9.9%  He had elevated d-dimer.  CT angiogram the chest performed with no evidence of pulmonary embolus    MAXIMUM TEMPERATURE of 100.7, heart rate of 122-128  Observed overnight, give a dose of Solu-Medrol, continue on IV antibiotic, if remains stable and blood culture showed no growth I anticipate that he can be discharged home tomorrow with recommendation to follow-up with the dentist.  He will need to continue on antibiotic    Power Martinez MD  06/11/18  4:24 PM

## 2018-06-11 NOTE — H&P
[facility] Medicine Services  HISTORY AND PHYSICAL    Date of Admission: 6/10/2018  Primary Care Physician: Fabio Pan MD    Subjective     Chief Complaint: Dental pain and nausea    History of Present Illness       32-year-old male with past medical history of diabetes mellitus type II, MRSA infection and otitis media comes into the ER with complaints of dental pain.  Patient states it started few days ago and progressively getting worse.  Last couple days she is been experiencing associated chills without any fever.  Patient also states he has been nauseated but denies any vomiting.  During initial evaluation in the ER patient was noted to be tachycardic with heart rate ranging from 120s to 150s.  Patient's EKG showed sinus tachycardia.  Patient was also noted to be tachypnea.  His initial lab showed hyperglycemia and elevated d-dimer.  His CTA was negative for any PE or consolidation.  In the ER patient was SIRS positive.  Patient will be admitted for IV antibiotic and further evaluation.    During initial questioning patient stated he was diagnosed with diabetes mellitus type II a year ago and was prescribed metformin.  Patient never started on the medication due to noncompliance.    Review of Systems     Otherwise complete ROS reviewed and negative except as mentioned in the HPI.    Past Medical History:   Past Medical History:   Diagnosis Date   • Diabetes mellitus    • MRSA (methicillin resistant Staphylococcus aureus)    • Otitis media      Past Surgical History:  Past Surgical History:   Procedure Laterality Date   • INCISION AND DRAINAGE ARM     • TYMPANOSTOMY TUBE PLACEMENT       Social History:  reports that he has never smoked. He has never used smokeless tobacco. He reports that he does not drink alcohol or use drugs.    Family History: family history includes Diabetes in his father; Stroke in his mother and sister.      Allergies:  No Known  "Allergies  Medications:  Prior to Admission medications    Not on File     Objective     Vital Signs: /63 (BP Location: Left arm, Patient Position: Lying)   Pulse (!) 122   Temp 100 °F (37.8 °C) (Oral)   Resp 20   Ht 195.6 cm (77\")   Wt (!) 160 kg (352 lb)   SpO2 94%   BMI 41.74 kg/m²   Physical Exam   Constitutional: He appears well-developed and well-nourished.   HENT:   Head: Normocephalic and atraumatic.   Eyes: EOM are normal. Pupils are equal, round, and reactive to light.   Neck: Normal range of motion. Neck supple.   Cardiovascular: Normal rate, regular rhythm and normal heart sounds.    Pulmonary/Chest: Effort normal and breath sounds normal.   Abdominal: Soft. Bowel sounds are normal.   Musculoskeletal: Normal range of motion.   Neurological: He is alert.   Skin: Skin is warm. Capillary refill takes less than 2 seconds.   Psychiatric: He has a normal mood and affect. His behavior is normal. Thought content normal.             Results Reviewed:  Lab Results (last 24 hours)     Procedure Component Value Units Date/Time    POC Glucose Once [[id number]]  (Abnormal) Collected:  06/10/18 2227    Specimen:  Blood Updated:  06/10/18 2239     Glucose 271 (H) mg/dL      Comment: : [id number] [facility]in[facility] ID: [id number]       TSH [[id number]]  (Normal) Collected:  06/10/18 2006    Specimen:  Blood Updated:  06/10/18 2055     TSH 0.707 mIU/mL     T3, Free [[id number]]  (Normal) Collected:  06/10/18 2006    Specimen:  Blood Updated:  06/10/18 2042     T3, Free 3.97 pg/mL     T4, Free [[id number]]  (Normal) Collected:  06/10/18 2006    Specimen:  Blood Updated:  06/10/18 2042     Free T4 1.44 ng/dL     Osmolality, Serum [[id number]]  (Abnormal) Collected:  06/10/18 2006    Specimen:  Blood Updated:  06/10/18 2040     Osmolality 283 (L) mOsm/kg     Blood Gas, Venous [[id number]]  (Abnormal) Collected:  06/10/18 2015    Specimen:  Venous Blood Updated:  06/10/18 2026     Site OTHER     pH, Venous 7.357 " pH Units      pCO2, Venous 41.9 mm Hg      pO2, Venous 36.8 mm Hg      HCO3, Venous 23.5 mmol/L      Base Excess, Venous -2.0 (L) mmol/L      O2 Saturation, Venous 68.0 %      Temperature 37.0 C      Barometric Pressure for Blood Gas 746 mmHg      Modality Room Air     Ventilator Mode NA     Collected by [id number]    Magnesium [[id number]]  (Normal) Collected:  06/10/18 2006    Specimen:  Blood Updated:  06/10/18 2024     Magnesium 1.5 mg/dL     Phosphorus [[id number]]  (Normal) Collected:  06/10/18 2006    Specimen:  Blood Updated:  06/10/18 2024     Phosphorus 3.3 mg/dL     POC Glucose Once [[id number]]  (Abnormal) Collected:  06/10/18 2005    Specimen:  Blood Updated:  06/10/18 2017     Glucose 300 (H) mg/dL      Comment: : [id number] Gloria PeonutolasMeter ID: [id number]       [place] Draw [[id number]] Collected:  06/10/18 1440    Specimen:  Blood Updated:  06/10/18 1545    Narrative:       The following orders were created for panel order [place] Draw.  Procedure                               Abnormality         Status                     ---------                               -----------         ------                     Light Blue Top[[id number]]                                   Final result               Green Top (Gel)[[id number]]                                  Final result               Lavender Top[[id number]]                                     Final result               Red Top[[id number]]                                          Final result               Blood Culture Bottle Set[[id number]]                         Final result                 Please view results for these tests on the individual orders.    Red Top [[id number]] Collected:  06/10/18 1440    Specimen:  Blood Updated:  06/10/18 1545     Extra Tube Hold for add-ons.     Comment: Auto resulted.       Blood Culture Bottle Set [[id number]] Collected:  06/10/18 1441    Specimen:  Blood from Arm, Right Updated:  06/10/18 1541     Extra Tube Hold  for add-ons.     Comment: Auto resulted.       Light Blue Top [[id number]] Collected:  06/10/18 1440    Specimen:  Blood Updated:  06/10/18 1545     Extra Tube hold for add-on     Comment: Auto resulted       Green Top (Gel) [[id number]] Collected:  06/10/18 1440    Specimen:  Blood Updated:  06/10/18 1545     Extra Tube Hold for add-ons.     Comment: Auto resulted.       Lavender Top [[id number]] Collected:  06/10/18 1440    Specimen:  Blood Updated:  06/10/18 1545     Extra Tube hold for add-on     Comment: Auto resulted       CK-MB [[id number]]  (Normal) Collected:  06/10/18 1440    Specimen:  Blood Updated:  06/10/18 1513     CKMB <0.22 ng/mL     Narrative:       CKMB Index not indicated    Myoglobin, Serum [[id number]]  (Normal) Collected:  06/10/18 1440    Specimen:  Blood Updated:  06/10/18 1513     Myoglobin 19.3 ng/mL     Troponin [[id number]]  (Normal) Collected:  06/10/18 1440    Specimen:  Blood Updated:  06/10/18 1513     Troponin I <0.012 ng/mL     D-dimer, Quantitative [[id number]]  (Abnormal) Collected:  06/10/18 1440    Specimen:  Blood Updated:  06/10/18 1505     D-Dimer, Quantitative 1.98 (H) mg/L (FEU)     Narrative:       Reference Range is 0-0.50 mg/L FEU. However, results <0.50 mg/L FEU tends to rule out DVT or PE. Results >0.50 mg/L FEU are not useful in predicting absence or presence of DVT or PE.    CK [[id number]]  (Normal) Collected:  06/10/18 1440    Specimen:  Blood Updated:  06/10/18 1504     Creatine Kinase 58 U/L     Comprehensive Metabolic Panel [[id number]]  (Abnormal) Collected:  06/10/18 1440    Specimen:  Blood Updated:  06/10/18 1501     Glucose 325 (H) mg/dL      BUN 12 mg/dL      Creatinine 0.55 mg/dL      Sodium 131 (L) mmol/L      Potassium 3.6 mmol/L      Chloride 96 (L) mmol/L      CO2 22.0 (L) mmol/L      Calcium 8.7 mg/dL      Total Protein 7.1 g/dL      Albumin 3.70 g/dL      ALT (SGPT) 47 U/L      AST (SGOT) 64 (H) U/L      Alkaline Phosphatase 109 U/L      Total Bilirubin 1.3  (H) mg/dL      eGFR Non African Amer >150 mL/min/1.73      Globulin 3.4 gm/dL      A/G Ratio 1.1 g/dL      BUN/Creatinine Ratio 21.8     Anion Gap 13.0 mmol/L     CBC & Differential [[id number]] Collected:  06/10/18 1440    Specimen:  Blood Updated:  06/10/18 1455    Narrative:       The following orders were created for panel order CBC & Differential.  Procedure                               Abnormality         Status                     ---------                               -----------         ------                     Manual Differential[[id number]]                                                         CBC Auto Differential[[id number]]        Abnormal            Final result                 Please view results for these tests on the individual orders.    CBC Auto Differential [[id number]]  (Abnormal) Collected:  06/10/18 1440    Specimen:  Blood Updated:  06/10/18 1455     WBC 9.25 10*3/mm3      RBC 5.38 10*6/mm3      Hemoglobin 14.0 g/dL      Hematocrit 40.0 %      MCV 74.3 (L) fL      MCH 26.0 (L) pg      MCHC 35.0 g/dL      RDW 12.6 %      RDW-SD 33.3 (L) fl      MPV 10.1 fL      Platelets 170 10*3/mm3      Neutrophil % 86.8 (H) %      Lymphocyte % 4.4 (L) %      Monocyte % 7.2 %      Eosinophil % 0.1 %      Basophil % 0.2 %      Neutrophils, Absolute 8.02 10*3/mm3      Lymphocytes, Absolute 0.41 (L) 10*3/mm3      Monocytes, Absolute 0.67 10*3/mm3      Eosinophils, Absolute 0.01 10*3/mm3      Basophils, Absolute 0.02 10*3/mm3         Imaging Results (last 24 hours)     Procedure Component Value Units Date/Time    CT Angiogram Chest With Contrast [[id number]] Collected:  06/10/18 1708     Updated:  06/10/18 1717    Narrative:       CT ANGIOGRAM CHEST W CONTRAST- 6/10/2018 4:47 PM CDT      HISTORY: soa, tachycardia      COMPARISON: Chest x-ray dated 6/10/2018.      DOSE LENGTH PRODUCT: 1076 mGy cm. Automated exposure control was also  utilized to decrease patient radiation dose.     TECHNIQUE: Helical  tomographic images of the chest were obtained after  the administration of intravenous contrast following angiogram protocol.  Additionally, 3D and multiplanar reformatted images were provided.        FINDINGS:    Pulmonary arteries: There is adequate enhancement of the pulmonary  arteries to evaluate for central and segmental pulmonary emboli. There  are no filling defects within the main, lobar, segmental or visualized  subsegmental pulmonary arteries. The pulmonary arteries are within  normal limits for size.      Aorta and great vessels: The aorta is well opacified and demonstrates no  dissection or aneurysm. The great vessels are normal in appearance.     Visualized neck base: The imaged portion of the base of the neck appears  unremarkable.      Lungs: No dense areas of lung consolidation are identified. There is a  very mild groundglass attenuation which is seen most prominently in the  dependent areas of the lungs, especially the bilateral lower lobes. No  mass or suspicious pulmonary nodule identified. The trachea and  bronchial tree are patent.      Heart: The heart is normal in size. There is no pericardial effusion.      Mediastinum and lymph nodes: Slightly prominent lymph nodes are seen in  both nader, the largest of which is seen in the left hilum measuring up  to 11 mm short axis (series 6-image [id number]). Additionally, there is mild  subcarinal and right level 2 adenopathy, the appearance of which is  nonspecific.      Skeletal and soft tissues: The osseous structures of the thorax and  surrounding soft tissues demonstrate no acute process.     Upper abdomen: The imaged portion of the upper abdomen demonstrates no  acute process. Hepatic steatosis.       Impression:       1. No evidence of pulmonary embolus.  2. No dense area of lung consolidation to suggest typical pneumonia.  3. There are subtle areas of groundglass attenuation in both lungs which  could reflect an underlying bronchiolitis or could  perhaps be secondary  to atelectasis. Additionally, mild hilar and mediastinal adenopathy are  seen which are favored to be reactive.  This report was finalized on 06/10/2018 17:14 by Dr Gurvinder Tao, .    XR Chest 1 View [[id number]] Collected:  06/10/18 1451     Updated:  06/10/18 1455    Narrative:       EXAMINATION: XR CHEST 1 VW-. 6/10/2018 2:51 PM CDT     CHEST, ONE VIEW:     HISTORY: Tachycardia     COMPARISON: 10/30/2012     A single frontal chest radiograph was obtained.     FINDINGS: The level of inspiration is relatively shallow and lung  volumes mildly diminished.     The lungs are clear without infiltrates.     The heart is normal in size, pulmonary circulation appropriate, without  heart failure.     The bony structures are intact.     Radiographically, chest is unchanged.                                     Impression:       1. No acute cardiopulmonary process.     This report was finalized on 06/10/2018 14:52 by Dr. Griffin Lopez MD.        I have personally reviewed and interpreted the radiology studies and ECG obtained at time of admission.     Assessment / Plan     Assessment:   Hospital Problem List     Dental infection        1.  Dental infection  2.  Systemic inflammatory response syndrome  3.  Diabetes mellitus type II with hyperglycemia  4.  Hyponatremia  5.  Tachycardia possibly secondary to systemic inflammatory response syndrome    Plan:      -Admit for further evaluation and telemetry  -Continue cardiac monitoring  -Continuous pulse ox  -Initial EKG noted for tachycardia  -Follow-up repeat EKG  -Continue IV antibiotic  -Consider facial imaging studies if high suspicion for underlying abscess  -Continue IV fluid  -Status post IV fluid bolus in the ER  -Consider repeat aggressive fluid resuscitation if indication of sepsis  -Lactate noted to be negative  -Follow-up a.m. chest x-ray  -Follow-up urinalysis  -Strict glycemic control  -Follow-up a.m. sodium level  -Follow-up urine sodium, urine  osmolarity and serum osmolarity  -CTA negative for PE  -GI prophylaxis  -DVT prophylaxis        Code Status: Full code     I discussed the patients findings and my recommendations with the patient's RN    Estimated length of stay 2-3 days    Hamilton Bennett MD   06/10/18   11:21 PM

## 2018-06-11 NOTE — PLAN OF CARE
Problem: Patient Care Overview  Goal: Plan of Care Review  Outcome: Ongoing (interventions implemented as appropriate)   06/11/18 1510   Coping/Psychosocial   Plan of Care Reviewed With patient   Plan of Care Review   Progress improving   OTHER   Outcome Summary PATIENT CONTINUES TO C/O LEFT UPPER MOUTH/DENTAL PAIN. DEMEROL GIVEN PRN. VSS. HR IMPROVING. CT FACIAL BONES DONE TODAY. CLINDAMYCIN CONTINUES Q6H. CONTINUE TO MONITOR.     Goal: Individualization and Mutuality  Outcome: Ongoing (interventions implemented as appropriate)    Goal: Discharge Needs Assessment  Outcome: Ongoing (interventions implemented as appropriate)    Goal: Interprofessional Rounds/Family Conf  Outcome: Ongoing (interventions implemented as appropriate)      Problem: Infection, Risk/Actual (Adult)  Goal: Identify Related Risk Factors and Signs and Symptoms  Outcome: Ongoing (interventions implemented as appropriate)    Goal: Infection Prevention/Resolution  Outcome: Ongoing (interventions implemented as appropriate)

## 2018-06-11 NOTE — PAYOR COMM NOTE
"6/11/18. [facility] [phone].  [phone]  INPATIENT ADMISSION.  ADMIT DATE 6/10/18 INPATIENT.  FAXING CLINICAL 6/11/18.          Aquilino Acosta (32 y.o. Male)     Date of Birth Social Security Number Address Home Phone MRN    [id number]  [id number]  [facility] [phone] [id number]    Sh[place] Marital Status          None Single       Admission Date Admission Type Admitting Provider Attending Provider Department, Room/Bed    6[facility]10/18 [facility] 403/1    Discharge Date Discharge Disposition Discharge Destination                       Attending Provider:  Power Martinez MD    Allergies:  No Known Allergies    Isolation:  None   Infection:  None   Code Status:  FULL    Ht:  195.6 cm (77\")   Wt:  161 kg (354 lb 5 oz)    Admission Cmt:  None   Principal Problem:  None                Active Insurance as of 6/10/2018     Primary Coverage     Payor Plan Insurance Group Employer/Plan Group    [employer] PPO [id number]     Payor Plan Address Payor Plan Phone Number Effective From Effective To    [address] [phone] 9/5/2010     [facility] [id number]       Subscriber Name Subscriber Birth Date Member ID       AQUILINO ACOSTA 1985 [id number]                 Emergency Contacts      (Rel.) Home Phone Work Phone Mobile Phone    Anette Acosta (Mother) [phone] -- --                     H&P  Date of Service: 6/10/2018 11:21 PM   Hamilton Bennett MD   Medicine   Expand All Collapse All    ManualTemplate   Copied                                                                                [facility] Medicine Services   HISTORY AND PHYSICAL   Date of Admission: 6/10/2018   Primary Care Physician: Fabio Pan MD    Subjective   Chief Complaint: Dental pain and nausea   History of Present " Illness   32-year-old male with past medical history of diabetes mellitus type II, MRSA infection and otitis media comes into the ER with complaints of dental pain. Patient states it started few days ago and progressively getting worse. Last couple days she is been experiencing associated chills without any fever. Patient also states he has been nauseated but denies any vomiting. During initial evaluation in the ER patient was noted to be tachycardic with heart rate ranging from 120s to 150s. Patient's EKG showed sinus tachycardia. Patient was also noted to be tachypnea. His initial lab showed hyperglycemia and elevated d-dimer. His CTA was negative for any PE or consolidation. In the ER patient was SIRS positive. Patient will be admitted for IV antibiotic and further evaluation.   During initial questioning patient stated he was diagnosed with diabetes mellitus type II a year ago and was prescribed metformin. Patient never started on the medication due to noncompliance.   Review of Systems   Otherwise complete ROS reviewed and negative except as mentioned in the HPI.   Past Medical History:    Medical History        Past Medical History:   Diagnosis Date   • Diabetes mellitus    • MRSA (methicillin resistant Staphylococcus aureus)    • Otitis media                       Progress Notes  Cosign Needed   Date of Service: 6/11/2018 7:55 AM  MARGARITA Lucero   Medicine      []Manual[]Template  []Copied       [facility] Medicine Services  INPATIENT PROGRESS NOTE     Length of Stay: 1  Date of Admission: 6/10/2018  Primary Care Physician: Fabio Pan MD     Subjective   Chief Complaint: left facial swelling  HPI   Reports left facial swelling that started Friday associated with subjective fever and chills, nausea and vomiting ×2.  Has continued to have facial swelling.  Has used ibuprofen without improvement in symptoms.  He denies any specific tooth pain.  He was noted be  tachycardic 120-150 in the ER.  Glucoses elevated 238 270.  He was placed on metformin one year ago but has not taken in over 6 months.  He has not followed regularly by primary care provider.  He denies chest pain or palpitations.  He received clindamycin, normal saline fluid bolus, regular insulin, morphine and Zofran in the emergency room.     Lying in bed.  No family present.  Mild left facial swelling.  Tender to touch.  Poor dental dilatation.  No visible abscess noted and gum.  He complains of facial and cheek bone pain.  Mild nausea.  No vomiting.     Review of Systems   Constitutional: Positive for chills and fever.   HENT: Positive for dental problem and facial swelling (Left). Negative for congestion.    Eyes: Negative for photophobia and visual disturbance.   Respiratory: Negative for shortness of breath and wheezing.    Cardiovascular: Negative for chest pain, palpitations and leg swelling.   Gastrointestinal: Positive for nausea and vomiting. Negative for abdominal distention, abdominal pain and diarrhea.   Endocrine: Negative for cold intolerance, heat intolerance and polyuria.   Genitourinary: Negative for dysuria, hematuria and urgency.   Musculoskeletal: Positive for neck pain.   Skin: Negative for wound.   Allergic/Immunologic: Negative for immunocompromised state.   Neurological: Positive for weakness.   Hematological: Negative for adenopathy. Does not bruise/bleed easily.   Psychiatric/Behavioral: Negative for agitation, behavioral problems and confusion.      All pertinent negatives and positives are as above. All other systems have been reviewed and are negative unless otherwise stated.      Objective    Temp:  [97.7 °F (36.5 °C)-100.7 °F (38.2 °C)] 97.9 °F (36.6 °C)  Heart Rate:  [] 97  Resp:  [18-26] 20  BP: (124-162)/(39-80) 149/66  Physical Exam   Constitutional: He is oriented to person, place, and time. He appears well-developed and well-nourished.   HENT:   Head: Normocephalic and  atraumatic.   Mild left facial swelling with erythema   Eyes: EOM are normal. Pupils are equal, round, and reactive to light.   Neck: Normal range of motion. Neck supple.   Cardiovascular: Regular rhythm, normal heart sounds and intact distal pulses.  Exam reveals no gallop and no friction rub.    No murmur heard.  Sinus tach 100-112 on telemetry   Pulmonary/Chest: Effort normal and breath sounds normal. No respiratory distress. He has no wheezes. He has no rales.   Abdominal: Soft. Bowel sounds are normal. He exhibits no distension. There is no tenderness.   Genitourinary:   Genitourinary Comments: Voiding   Musculoskeletal: He exhibits edema (Left facial).   Neurological: He is alert and oriented to person, place, and time.   Skin: Skin is warm and dry. There is erythema (Mild erythema left facial region).   Psychiatric: He has a normal mood and affect. His behavior is normal. Judgment and thought content normal.      Results Review:  I have reviewed the labs, radiology results, and diagnostic studies.     Laboratory Data:      Results from last 7 days  Lab Units 06/11/18  0601 06/10/18  1440   WBC 10*3/mm3 9.07 9.25   HEMOGLOBIN g/dL 12.2* 14.0   HEMATOCRIT % 35.6* 40.0   PLATELETS 10*3/mm3 173 170            Results from last 7 days  Lab Units 06/11/18  0601 06/10/18  1440   SODIUM mmol/L 133* 131*   POTASSIUM mmol/L 3.7 3.6   CHLORIDE mmol/L 98 96*   CO2 mmol/L 25.0 22.0*   BUN mg/dL 5 12   CREATININE mg/dL 0.60 0.55   CALCIUM mg/dL 7.9* 8.7   BILIRUBIN mg/dL 1.0 1.3*   ALK PHOS U/L 76 109   ALT (SGPT) U/L 37 47   AST (SGOT) U/L 38 64*   GLUCOSE mg/dL 236* 325*                 Imaging Results (all)      Procedure Component Value Units Date/Time     CT Angiogram Chest With Contrast [[id number]] Collected:  06/10/18 1708       Updated:  06/10/18 1717     Narrative:        CT ANGIOGRAM CHEST W CONTRAST- 6/10/2018 4:47 PM CDT      HISTORY: soa, tachycardia      COMPARISON: Chest x-ray dated 6/10/2018.      DOSE  LENGTH PRODUCT: 1076 mGy cm. Automated exposure control was also  utilized to decrease patient radiation dose.     TECHNIQUE: Helical tomographic images of the chest were obtained after  the administration of intravenous contrast following angiogram protocol.  Additionally, 3D and multiplanar reformatted images were provided.        FINDINGS:    Pulmonary arteries: There is adequate enhancement of the pulmonary  arteries to evaluate for central and segmental pulmonary emboli. There  are no filling defects within the main, lobar, segmental or visualized  subsegmental pulmonary arteries. The pulmonary arteries are within  normal limits for size.      Aorta and great vessels: The aorta is well opacified and demonstrates no  dissection or aneurysm. The great vessels are normal in appearance.     Visualized neck base: The imaged portion of the base of the neck appears  unremarkable.      Lungs: No dense areas of lung consolidation are identified. There is a  very mild groundglass attenuation which is seen most prominently in the  dependent areas of the lungs, especially the bilateral lower lobes. No  mass or suspicious pulmonary nodule identified. The trachea and  bronchial tree are patent.      Heart: The heart is normal in size. There is no pericardial effusion.      Mediastinum and lymph nodes: Slightly prominent lymph nodes are seen in  both nader, the largest of which is seen in the left hilum measuring up  to 11 mm short axis (series 6-image 57). Additionally, there is mild  subcarinal and right level 2 adenopathy, the appearance of which is  nonspecific.      Skeletal and soft tissues: The osseous structures of the thorax and  surrounding soft tissues demonstrate no acute process.     Upper abdomen: The imaged portion of the upper abdomen demonstrates no  acute process. Hepatic steatosis.        Impression:        1. No evidence of pulmonary embolus.  2. No dense area of lung consolidation to suggest typical  pneumonia.  3. There are subtle areas of groundglass attenuation in both lungs which  could reflect an underlying bronchiolitis or could perhaps be secondary  to atelectasis. Additionally, mild hilar and mediastinal adenopathy are  seen which are favored to be reactive.  This report was finalized on 06/10/2018 17:14 by Dr Gurvinder Tao, .     XR Chest 1 View [[id number]] Collected:  06/10/18 1451       Updated:  06/10/18 1455     Narrative:        EXAMINATION: XR CHEST 1 VW-. 6/10/2018 2:51 PM CDT     CHEST, ONE VIEW:     HISTORY: Tachycardia     COMPARISON: 10/30/2012     A single frontal chest radiograph was obtained.     FINDINGS: The level of inspiration is relatively shallow and lung  volumes mildly diminished.     The lungs are clear without infiltrates.     The heart is normal in size, pulmonary circulation appropriate, without  heart failure.     The bony structures are intact.     Radiographically, chest is unchanged.                                      Impression:        1. No acute cardiopulmonary process.     This report was finalized on 06/10/2018 14:52 by Dr. Griffin Lopez MD.             Intake/Output     Intake/Output Summary (Last 24 hours) at 06/11/18 0756  Last data filed at 06/11/18 0200    Gross per 24 hour   Intake                0 ml   Output             2675 ml   Net            -2675 ml         Scheduled Meds     clindamycin 600 mg Intravenous Q6H   enoxaparin 40 mg Subcutaneous Q12H   famotidine 40 mg Oral Daily   insulin lispro 2-7 Units Subcutaneous 4x Daily With Meals & Nightly         I have reviewed the patient current medications.      Assessment/Plan          Hospital Problem List      Dental infection          Assessment:  1.  Left facial swelling  2.  Suspect dental infection  3.  Diabetes mellitus type II with hyperglycemia  A1c 9.9 (6/11/18)  4.  Hyponatremia, improving  5.  Sinus tachycardia  6.  Systemic inflammatory response syndrome     Plan:  1.  CT facial bones with without  contrast.  Discussed with radiology and this will assess oh bone and soft tissue  2.  Clindamycin 600 mg IV every 6 hours  3.  Normal saline at 125 mL per hour.  Will decrease to 75 mL per hour and add 20 mEq of potassium per liter  4.  Accu-Cheks before meals and at bedtime with sliding scale insulin coverage.  He had been diagnosed as diabetic one year ago.  However, he has not taken metformin in at least 6 months.  Hold metformin at present as patient is having CT contrast  5.  Lovenox for deep vein thrombosis prophylaxis  6.  Check A1c 9.9  7.  Basic metabolic panel tomorrow to monitor hyponatremia     The above documentation resulted from a face-to-face encounter by me Joselyn AVILA, [facility].        Discharge Planning: I expect patient to be discharged to home in 1-2 days.     MARGARITA Melgoza   06/11/18   7:56 AM                      Hospital Medications (active)       Dose Frequency Start End    clindamycin (CLEOCIN) 600 mg in dextrose 5% 50 mL IVPB (premix) 600 mg Once 6/10/2018 6/10/2018    Sig - Route: Infuse 50 mL into a venous catheter 1 (One) Time. - Intravenous    Cosign for Ordering: Required by Rasheed ROSE MD    clindamycin (CLEOCIN) 600 mg in dextrose 5% 50 mL IVPB (premix) 600 mg Every 6 Hours 6/11/2018 6/18/2018    Sig - Route: Infuse 50 mL into a venous catheter Every 6 (Six) Hours. - Intravenous    dextrose (D50W) solution 25 g 25 g Every 15 Minutes PRN 6/11/2018     Sig - Route: Infuse 50 mL into a venous catheter Every 15 (Fifteen) Minutes As Needed for Low Blood Sugar (Blood Sugar Less Than 70). - Intravenous    dextrose (GLUTOSE) oral gel 15 g 15 g Every 15 Minutes PRN 6/11/2018     Sig - Route: Take 15 g by mouth Every 15 (Fifteen) Minutes As Needed for Low Blood Sugar (Blood sugar less than 70). - Oral    enoxaparin (LOVENOX) syringe 40 mg 40 mg Every 12 Hours 6/11/2018     Sig - Route: Inject 0.4 mL under the skin Every 12 (Twelve) Hours. - Subcutaneous    famotidine  (PEPCID) tablet 40 mg 40 mg Daily 6/11/2018     Sig - Route: Take 2 tablets by mouth Daily. - Oral    fentaNYL citrate (PF) (SUBLIMAZE) injection 75 mcg 75 mcg Once 6/10/2018 6/10/2018    Sig - Route: Infuse 1.5 mL into a venous catheter 1 (One) Time. - Intravenous    glucagon (human recombinant) (GLUCAGEN DIAGNOSTIC) injection 1 mg 1 mg As Needed 6/11/2018     Sig - Route: Inject 1 mg under the skin As Needed (Blood Glucose Less Than 70). - Subcutaneous    insulin lispro (humaLOG) injection 2-7 Units 2-7 Units 4 Times Daily With Meals & Nightly 6/11/2018     Sig - Route: Inject 2-7 Units under the skin 4 (Four) Times a Day With Meals & at Bedtime. - Subcutaneous    insulin regular (humuLIN R,novoLIN R) injection 5 Units 5 Units Once 6/10/2018 6/10/2018    Sig - Route: Infuse 5 Units into a venous catheter 1 (One) Time. - Intravenous    Cosign for Ordering: Accepted by Anish Krishnan MD on 6/11/2018  2:44 AM    iopamidol (ISOVUE-370) 76 % injection 200 mL 200 mL Once in Imaging 6/10/2018 6/10/2018    Sig - Route: Infuse 200 mL into a venous catheter Once. - Intravenous    meperidine (DEMEROL) injection 50 mg 50 mg Every 3 Hours PRN 6/11/2018 6/14/2018    Sig - Route: Infuse 1 mL into a venous catheter Every 3 (Three) Hours As Needed for Severe Pain . - Intravenous    Morphine injection 6 mg 6 mg Once 6/10/2018 6/10/2018    Sig - Route: Infuse 0.6 mL into a venous catheter 1 (One) Time. - Intravenous    Morphine sulfate (PF) injection 4 mg 4 mg Once 6/11/2018 6/11/2018    Sig - Route: Infuse 1 mL into a venous catheter 1 (One) Time. - Intravenous    ondansetron (ZOFRAN) injection 4 mg 4 mg Once 6/10/2018 6/10/2018    Sig - Route: Infuse 2 mL into a venous catheter 1 (One) Time. - Intravenous    ondansetron (ZOFRAN) injection 4 mg 4 mg Every 6 Hours PRN 6/11/2018     Sig - Route: Infuse 2 mL into a venous catheter Every 6 (Six) Hours As Needed for Nausea or Vomiting. - Intravenous    ondansetron (ZOFRAN)  injection 4 mg 4 mg Once 6/11/2018 6/11/2018    Sig - Route: Infuse 2 mL into a venous catheter 1 (One) Time. - Intravenous    sodium chloride 0.9 % bolus 1,000 mL 1,000 mL Once 6/10/2018 6/10/2018    Sig - Route: Infuse 1,000 mL into a venous catheter 1 (One) Time. - Intravenous    Cosign for Ordering: Required by Rasheed ROSE MD    sodium chloride 0.9 % bolus 1,000 mL 1,000 mL Once 6/10/2018 6/10/2018    Sig - Route: Infuse 1,000 mL into a venous catheter 1 (One) Time. - Intravenous    sodium chloride 0.9 % bolus 1,000 mL 1,000 mL Once 6/10/2018 6/10/2018    Sig - Route: Infuse 1,000 mL into a venous catheter 1 (One) Time. - Intravenous    Cosign for Ordering: Accepted by Hamilton Bennett MD on 6/11/2018  1:13 AM    sodium chloride 0.9 % flush 1-10 mL 1-10 mL As Needed 6/11/2018     Sig - Route: Infuse 1-10 mL into a venous catheter As Needed for Line Care. - Intravenous    sodium chloride 0.9 % with KCl 20 mEq/L infusion 75 mL/hr Continuous 6/11/2018     Sig - Route: Infuse 75 mL/hr into a venous catheter Continuous. - Intravenous    enoxaparin (LOVENOX) syringe 40 mg (Discontinued) 40 mg Every 24 Hours 6/11/2018 6/11/2018    Sig - Route: Inject 0.4 mL under the skin Daily. - Subcutaneous    Reason for Discontinue: Dose adjustment    metFORMIN (GLUCOPHAGE) tablet 500 mg (Discontinued) 500 mg Once 6/10/2018 6/10/2018    Sig - Route: Take 1 tablet by mouth 1 (One) Time. - Oral    Cosign for Ordering: Accepted by Anish Krishnan MD on 6/11/2018  2:44 AM    sodium chloride 0.9 % infusion (Discontinued) 125 mL/hr Continuous 6/11/2018 6/11/2018    Sig - Route: Infuse 125 mL/hr into a venous catheter Continuous. - Intravenous

## 2018-06-12 VITALS
RESPIRATION RATE: 16 BRPM | HEIGHT: 77 IN | HEART RATE: 91 BPM | TEMPERATURE: 97.5 F | WEIGHT: 315 LBS | SYSTOLIC BLOOD PRESSURE: 124 MMHG | BODY MASS INDEX: 37.19 KG/M2 | DIASTOLIC BLOOD PRESSURE: 60 MMHG | OXYGEN SATURATION: 98 %

## 2018-06-12 LAB — GLUCOSE BLDC GLUCOMTR-MCNC: 248 MG/DL (ref 70–130)

## 2018-06-12 PROCEDURE — 63710000001 INSULIN LISPRO (HUMAN) PER 5 UNITS: Performed by: FAMILY MEDICINE

## 2018-06-12 PROCEDURE — 94760 N-INVAS EAR/PLS OXIMETRY 1: CPT

## 2018-06-12 PROCEDURE — 82962 GLUCOSE BLOOD TEST: CPT

## 2018-06-12 PROCEDURE — 25810000003 SODIUM CHLORIDE 0.9 % WITH KCL 20 MEQ 20-0.9 MEQ/L-% SOLUTION: Performed by: NURSE PRACTITIONER

## 2018-06-12 PROCEDURE — 25010000002 ENOXAPARIN PER 10 MG: Performed by: FAMILY MEDICINE

## 2018-06-12 PROCEDURE — 94799 UNLISTED PULMONARY SVC/PX: CPT

## 2018-06-12 RX ORDER — CLINDAMYCIN HYDROCHLORIDE 150 MG/1
300 CAPSULE ORAL EVERY 8 HOURS SCHEDULED
Status: DISCONTINUED | OUTPATIENT
Start: 2018-06-12 | End: 2018-06-12 | Stop reason: HOSPADM

## 2018-06-12 RX ORDER — CLINDAMYCIN HYDROCHLORIDE 300 MG/1
300 CAPSULE ORAL EVERY 8 HOURS SCHEDULED
Qty: 21 CAPSULE | Refills: 0 | Status: SHIPPED | OUTPATIENT
Start: 2018-06-12 | End: 2018-06-19

## 2018-06-12 RX ORDER — HYDROCODONE BITARTRATE AND ACETAMINOPHEN 5; 325 MG/1; MG/1
1 TABLET ORAL EVERY 8 HOURS PRN
Qty: 9 TABLET | Refills: 0 | Status: SHIPPED | OUTPATIENT
Start: 2018-06-12 | End: 2018-06-21

## 2018-06-12 RX ADMIN — CLINDAMYCIN PHOSPHATE 600 MG: 12 INJECTION, SOLUTION INTRAVENOUS at 03:54

## 2018-06-12 RX ADMIN — HYDROCODONE BITARTRATE AND ACETAMINOPHEN 1 TABLET: 5; 325 TABLET ORAL at 08:27

## 2018-06-12 RX ADMIN — CLINDAMYCIN PHOSPHATE 600 MG: 12 INJECTION, SOLUTION INTRAVENOUS at 08:28

## 2018-06-12 RX ADMIN — FAMOTIDINE 40 MG: 20 TABLET, FILM COATED ORAL at 08:27

## 2018-06-12 RX ADMIN — ENOXAPARIN SODIUM 40 MG: 40 INJECTION SUBCUTANEOUS at 06:31

## 2018-06-12 RX ADMIN — INSULIN LISPRO 3 UNITS: 100 INJECTION, SOLUTION INTRAVENOUS; SUBCUTANEOUS at 08:28

## 2018-06-12 RX ADMIN — POTASSIUM CHLORIDE AND SODIUM CHLORIDE 75 ML/HR: 900; 150 INJECTION, SOLUTION INTRAVENOUS at 03:53

## 2018-06-12 NOTE — DISCHARGE INSTRUCTIONS
1.  Should he have worsening symptoms of left facial swelling with fever he should seek medical attention.  2.  Complete clindamycin after discharge.  3.  May resume metformin 6/13/18.  Hold ×48 hours post contrast on 6/11/18.  4.  Have arranged follow-up appointment with MARGARITA Motta to establish care as new patient.  5.  Need to arrange follow-up with dentist for tooth abscess.

## 2018-06-12 NOTE — DISCHARGE SUMMARY
[facility]  DISCHARGE SUMMARY       Date of Admission: 6/10/2018  Date of Discharge:  6/12/2018  Primary Care Physician: MARGARITA Louis    Discharge Diagnoses:  Hospital Problem List     Dental infection        Discharge diagnoses   1.  Left facial swelling  2.  Suspect dental infection left maxillary lateral incisor tooth root abscess, tooth roots first molar right mandible likely periapical abscess  3.  Dental caries  4.  Diabetes mellitus type II with hyperglycemia  A1c 9.9 (6/11/18)  5.  Hyponatremia, improving  6.  Sinus tachycardia, resolved  7.  Systemic inflammatory response syndrome  Presenting Problem/History of Present Illness:  Dental infection [K04.7]     Chief Complaint on Day of Discharge: Feels better.  Less facial swelling  History of Present Illness on Day of Discharge:   Lying in bed.  No family present.  Less facial swelling left side noted.  He is swallowing better.  He feels better after receiving dose of steroids and IV antibiotics.  He denies nausea, vomiting or abdominal pain.  He denies chest pain, palpitations or shortness of breath.  He tells me he has metformin at home 500 mg twice daily but has not been taking recently.  He plans to follow-up with Dr. Allen, dentist after discharge.    Consults: None    Procedures Performed: none    Pertinent Test Results:     Results from last 7 days  Lab Units 06/11/18  0601 06/10/18  1440   WBC 10*3/mm3 9.07 9.25   HEMOGLOBIN g/dL 12.2* 14.0   HEMATOCRIT % 35.6* 40.0   PLATELETS 10*3/mm3 173 170          Results from last 7 days  Lab Units 06/11/18  0601 06/10/18  1440   SODIUM mmol/L 133* 131*   POTASSIUM mmol/L 3.7 3.6   CHLORIDE mmol/L 98 96*   CO2 mmol/L 25.0 22.0*   BUN mg/dL 5 12   CREATININE mg/dL 0.60 0.55   CALCIUM mg/dL 7.9* 8.7   BILIRUBIN mg/dL 1.0 1.3*   ALK PHOS U/L 76 109   ALT (SGPT) U/L 37 47   AST (SGOT) U/L 38 64*   GLUCOSE mg/dL 236* 325*     Lactic Acid, Plasma  [[id number]] (Normal) Collected: 06/11/18 0601   Lab Status: Final result Specimen: Blood Updated: 06/11/18 0650    Lactate 1.0 mmol/L    Magnesium [[id number]] (Normal) Collected: 06/11/18 0601   Lab Status: Final result Specimen: Blood Updated: 06/11/18 0642    Magnesium 1.7 mg/dL    Phosphorus [[id number]] (Normal) Collected: 06/11/18 0601   Lab Status: Final result Specimen: Blood Updated: 06/11/18 0642    Phosphorus 3.6 mg/dL    Lipid Panel [[id number]] (Abnormal) Collected: 06/11/18 0601   Lab Status: Final result Specimen: Blood Updated: 06/11/18 0653    Total Cholesterol 108 (L) mg/dL     Triglycerides 207 (H) mg/dL     HDL Cholesterol 17 (L) mg/dL     LDL Cholesterol  55 mg/dL     LDL/HDL Ratio 2.92   Hemoglobin A1c [[id number]] Collected: 06/11/18 0601   Lab Status: Final result Specimen: Blood Updated: 06/11/18 0709    Hemoglobin A1C 9.9 %    Narrative:     Less than 6.0           Non-Diabetic Range  6.0-7.0                 ADA Therapeutic Target  Greater than 7.0        Action Suggested   TSH [[id number]] (Normal) Collected: 06/11/18 0601   Lab Status: Final result Specimen: Blood Updated: 06/11/18 0713    TSH 0.816 mIU/mL    T4, Free [[id number]] (Normal) Collected: 06/11/18 0601   Lab Status: Final result Specimen: Blood Updated: 06/11/18 0659    Free T4 1.44 ng/dL    Troponin [[id number]] (Normal) Collected: 06/11/18 0236   Lab Status: Final result Specimen: Blood Updated: 06/11/18 0427    Troponin I <0.012 ng/mL    Osmolality, Serum [[id number]] (Abnormal) Collected: 06/11/18 0236   Lab Status: Final result Specimen: Blood Updated: 06/11/18 0450    Osmolality 277 (L) mOsm/kg    Sodium, Urine, Random - Urine, Clean Catch [[id number]] (Normal) Collected: 06/11/18 0217   Lab Status: Final result Specimen: Urine from Urine, Clean Catch Updated: 06/11/18 0308    Sodium, Urine 49 mmol/L    Osmolality, Urine - Urine, Clean Catch [[id number]] (Abnormal) Collected: 06/11/18 0217   Lab Status: Final result Specimen:  Urine from Urine, Clean Catch Updated: 06/11/18 0450    Osmolality, Urine 379 (L) mOsm/kg    Blood Culture With JESUS - Blood, [[id number]] (Normal) Collected: 06/11/18 0022   Lab Status: Preliminary result Specimen: Blood from Arm, Left Updated: 06/12/18 0045    Blood Culture No growth at 24 hours   Lactic Acid, Plasma [[id number]] (Normal) Collected: 06/11/18 0020   Lab Status: Final result Specimen: Blood Updated: 06/11/18 0045    Lactate 1.3 mmol/L    Blood Culture With JESUS - Blood, [[id number]] (Normal) Collected: 06/11/18 0015   Lab Status: Preliminary result Specimen: Blood from Arm, Right Updated: 06/12/18 0045    Blood Culture No growth at 24 hours   Urinalysis With / Culture If Indicated - Urine, Clean Catch [[id number]] (Abnormal) Collected: 06/11/18 0000   Lab Status: Final result Specimen: Urine from Urine, Clean Catch Updated: 06/11/18 0020    Color, UA Yellow    Appearance, UA Clear    pH, UA 5.5    Specific Gravity, UA 1.027    Glucose, UA >=1000 mg/dL (3+) (A)    Ketones, UA 80 mg/dL (3+) (A)    Bilirubin, UA Negative    Blood, UA Negative    Protein, UA 30 mg/dL (1+) (A)    Leuk Esterase, UA Negative    Nitrite, UA Negative    Urobilinogen, UA 0.2 E.U./dL   Urine Drug Screen - Urine, Clean Catch [[id number]] (Abnormal) Collected: 06/11/18 0000   Lab Status: Final result Specimen: Urine from Urine, Clean Catch Updated: 06/11/18 0039    Amphetamine Screen, Urine Negative    Barbiturates Screen, Urine Negative    Benzodiazepine Screen, Urine Negative    Cocaine Screen, Urine Negative    Methadone Screen, Urine Negative    Opiate Screen Positive (A)    Phencyclidine (PCP), Urine Negative    THC, Screen, Urine Negative     Urinalysis, Microscopic Only - Urine, Clean Catch [[id number]] (Abnormal) Collected: 06/11/18 0000   Lab Status: Final result Specimen: Urine from Urine, Clean Catch Updated: 06/11/18 0020    RBC, UA 3-5 (A) /HPF     WBC, UA 0-2 (A) /HPF     Bacteria, UA None Seen /HPF     Squamous Epithelial  Cells, UA None Seen /HPF     Hyaline Casts, UA None Seen /LPF     Methodology Automated Microscopy   Blood Gas, Venous [[id number]] (Abnormal) Collected: 06/10/18 2015   Lab Status: Final result Specimen: Venous Blood Updated: 06/10/18 2026    Site OTHER    pH, Venous 7.357 pH Units     pCO2, Venous 41.9 mm Hg     pO2, Venous 36.8 mm Hg     HCO3, Venous 23.5 mmol/L     Base Excess, Venous -2.0 (L) mmol/L     O2 Saturation, Venous 68.0 %     Temperature 37.0 C     Barometric Pressure for Blood Gas 746 mmHg     Modality Room Air    Ventilator Mode NA    Collected by [id number]   Magnesium [[id number]] (Normal) Collected: 06/10/18 2006   Lab Status: Final result Specimen: Blood Updated: 06/10/18 2024    Magnesium 1.5 mg/dL    Phosphorus [[id number]] (Normal) Collected: 06/10/18 2006   Lab Status: Final result Specimen: Blood Updated: 06/10/18 2024    Phosphorus 3.3 mg/dL    Osmolality, Serum [[id number]] (Abnormal) Collected: 06/10/18 2006   Lab Status: Final result Specimen: Blood Updated: 06/10/18 2040    Osmolality 283 (L) mOsm/kg    TSH [[id number]] (Normal) Collected: 06/10/18 2006   Lab Status: Final result Specimen: Blood Updated: 06/10/18 2055    TSH 0.707 mIU/mL    T3, Free [[id number]] (Normal) Collected: 06/10/18 2006   Lab Status: Final result Specimen: Blood Updated: 06/10/18 2042    T3, Free 3.97 pg/mL    T4, Free [[id number]] (Normal) Collected: 06/10/18 2006   Lab Status: Final result Specimen: Blood Updated: 06/10/18 2042    Free T4 1.44 ng/dL    CK [[id number]] (Normal) Collected: 06/10/18 1440   Lab Status: Final result Specimen: Blood Updated: 06/10/18 1504    Creatine Kinase 58 U/L    CK-MB [[id number]] (Normal) Collected: 06/10/18 1440   Lab Status: Final result Specimen: Blood Updated: 06/10/18 1513    CKMB <0.22 ng/mL    Narrative:     CKMB Index not indicated   Myoglobin, Serum [[id number]] (Normal) Collected: 06/10/18 1440   Lab Status: Final result Specimen: Blood Updated: 06/10/18 1513    Myoglobin 19.3  ng/mL      D-dimer, Quantitative [[id number]] (Abnormal) Collected: 06/10/18 1440   Lab Status: Final result Specimen: Blood Updated: 06/10/18 1505    D-Dimer, Quantitative 1.98 (H) mg/L (FEU)    Narrative:     Reference Range is 0-0.50 mg/L FEU. However, results <0.50 mg/L FEU tends to rule out DVT or PE. Results >0.50 mg/L FEU are not useful in predicting absence or presence of DVT or PE.   Troponin [[id number]] (Normal) Collected: 06/10/18 1440   Lab Status: Final result Specimen: Blood Updated: 06/10/18 1513    Troponin I <0.012 ng/mL      Culture Data:   Blood Culture   Date Value Ref Range Status   06/11/2018 No growth at 24 hours  Preliminary   06/11/2018 No growth at 24 hours  Preliminary     Imaging Results (all)     Procedure Component Value Units Date/Time    CT Facial Bones With & Without Contrast [[id number]] Collected:  06/11/18 1351     Updated:  06/11/18 1407    Narrative:       EXAMINATION:  CT FACIAL BONES W WO CONTRAST-  6/11/2018 10:25 AM CDT     HISTORY: K04.7-Periapical abscess without sinus; R65.10-Systemic  inflammatory response syndrome (sirs) of non-infectious origin without  acute organ dysfunction. Mass, lump or swelling of the face.      COMPARISON: No comparison study.     TECHNIQUE: Spiral CT was performed of the face before and after IV  contrast administration. Sagittal and coronal images were reconstructed.  DLP: 1315 mGy-cm.     FINDINGS: The parotid and submandibular glands are symmetric. There are  small level 1 and level 2 lymph nodes that do not appear to be  pathologically enlarged. There is prominence of the lingual tonsils at  the base of the tongue. There appears to be some edema within the soft  tissues adjacent to the maxilla on the left side. A fluid collection is  not seen to suggest abscess. There is lucency around the lateral incisor  tooth root on the left side in the maxilla. There is also lucency around  the first molar in the right mandible. There is a large dental  caries  involving the right second maxillary premolar. There are no air-fluid  levels within the paranasal sinuses.       Impression:       1. Mild edema in the left face adjacent to the left maxilla. There is a  lucency surrounding the left maxillary lateral incisor tooth root that  could represent a tooth root abscess.  2. Lucency around the tooth roots of first molar in the right mandible  that could represent periapical abscess.  3. Dental caries.  4. Mass effect at the tongue base in the midline most likely represents  hypertrophic lingual tonsillar tissue. There are small lymph nodes in  the neck bilaterally. Lymph nodes are probably reactive.  This report was finalized on 06/11/2018 14:04 by Dr. August Michaels MD.    CT Angiogram Chest With Contrast [[id number]] Collected:  06/10/18 1708     Updated:  06/10/18 1717    Narrative:       CT ANGIOGRAM CHEST W CONTRAST- 6/10/2018 4:47 PM CDT      HISTORY: soa, tachycardia      COMPARISON: Chest x-ray dated 6/10/2018.      DOSE LENGTH PRODUCT: 1076 mGy cm. Automated exposure control was also  utilized to decrease patient radiation dose.     TECHNIQUE: Helical tomographic images of the chest were obtained after  the administration of intravenous contrast following angiogram protocol.  Additionally, 3D and multiplanar reformatted images were provided.        FINDINGS:    Pulmonary arteries: There is adequate enhancement of the pulmonary  arteries to evaluate for central and segmental pulmonary emboli. There  are no filling defects within the main, lobar, segmental or visualized  subsegmental pulmonary arteries. The pulmonary arteries are within  normal limits for size.      Aorta and great vessels: The aorta is well opacified and demonstrates no  dissection or aneurysm. The great vessels are normal in appearance.     Visualized neck base: The imaged portion of the base of the neck appears  unremarkable.      Lungs: No dense areas of lung consolidation are identified.  There is a  very mild groundglass attenuation which is seen most prominently in the  dependent areas of the lungs, especially the bilateral lower lobes. No  mass or suspicious pulmonary nodule identified. The trachea and  bronchial tree are patent.      Heart: The heart is normal in size. There is no pericardial effusion.      Mediastinum and lymph nodes: Slightly prominent lymph nodes are seen in  both nader, the largest of which is seen in the left hilum measuring up  to 11 mm short axis (series 6-image [id number]). Additionally, there is mild  subcarinal and right level 2 adenopathy, the appearance of which is  nonspecific.      Skeletal and soft tissues: The osseous structures of the thorax and  surrounding soft tissues demonstrate no acute process.     Upper abdomen: The imaged portion of the upper abdomen demonstrates no  acute process. Hepatic steatosis.       Impression:       1. No evidence of pulmonary embolus.  2. No dense area of lung consolidation to suggest typical pneumonia.  3. There are subtle areas of groundglass attenuation in both lungs which  could reflect an underlying bronchiolitis or could perhaps be secondary  to atelectasis. Additionally, mild hilar and mediastinal adenopathy are  seen which are favored to be reactive.  This report was finalized on 06/10/2018 17:14 by Dr Gurvinder Tao, .    XR Chest 1 View [[id number]] Collected:  06/10/18 1451     Updated:  06/10/18 1455    Narrative:       EXAMINATION: XR CHEST 1 VW-. 6/10/2018 2:51 PM CDT     CHEST, ONE VIEW:     HISTORY: Tachycardia     COMPARISON: 10/30/2012     A single frontal chest radiograph was obtained.     FINDINGS: The level of inspiration is relatively shallow and lung  volumes mildly diminished.     The lungs are clear without infiltrates.     The heart is normal in size, pulmonary circulation appropriate, without  heart failure.     The bony structures are intact.     Radiographically, chest is unchanged.                                      Impression:       1. No acute cardiopulmonary process.     This report was finalized on 06/10/2018 14:52 by Dr. Griffin Lopez MD.        Hospital Course  Patient is a 32 y.o. male presented to [facility] emergency room 6/10/18 with complaints of left facial swelling that started Friday associated with subjective fever and chills, nausea and vomiting ×2.  Facial swelling continued.  He used ibuprofen without improvement in symptoms.  He was noted have several dental caries.  He was tachycardic 120-150 in the emergency room.  Glucoses elevated 238 270.  Patient has been on metformin in the past but has not taken recently.  He received clindamycin, normal saline fluid bolus, regular insulin, Zofran, and morphine in the emergency room.  Chest x-ray no acute cardiopulmonary process.  D-dimer elevated at 1.98.  CT angiogram the chest no pulmonary embolus.  Dense lung consolidation to suggest typical pneumonia.  Gram glass attenuations both lung could reflect underlying bronchiolitis or atelectasis.  Hilar adenopathy favored to be reactive.    He was admitted to the medical floor with left facial swelling, systemic inflammatory response syndrome, elevated glucoses.  Clindamycin continued IV.  He was hydrated with IV fluids.  He is placed on telemetry and initially was noted be tachycardic.  However, during hospitalization heart rate has decreased to 80.  CT scan facial bones with and without contrast 6/11/18 noted mild edema in the left face adjacent to the left maxilla.  There is lucency surrounding the left maxillary lateral incisor tooth root that could represent a tooth root abscess. Lucency around the tooth roots of the first molar in the right mandible that could represent periapical abscess.  Dental caries.  Mass effect at the tongue base in the midline most likely represents hypertrophic lingular tonsillar tissue.  Small lymph nodes in the neck bilaterally probably reactive.  He was given a dose  "of Solu-Medrol IV.  The following morning he was noted to have less facial swelling.  He indicates he feels better and is tolerating diet without difficulty swallowing.  Last temperature elevation 100.7 at 1 AM on date of admission.  Otherwise, he has remained afebrile.  WBC within normal limits.  He will be continued on clindamycin at discharge.  He has been advised to seek care with dentist.  He has seen Dr. Allen in the past and plans to arrange upcoming appointment to address dental caries and tooth abscesses.    He has history of diabetes mellitus type II.  A1c 9.9.  He has taken metformin in the past but has not taken recently.  He tells me that both he and his mother take the same dose of metformin.  Glucoses 236 - 325.  He was placed on sliding scale insulin coverage during hospitalization.  He has been advised to resume metformin 6/13/18.  He should hold 48 hours post contrast on 6/11/18.  Sodium is 131 on admission improved 133 with hydration fluid hydration.    He has no established primary care provider at present.  He tells me his father is followed by [facility].  Have arranged follow up with MARGARITA Motta 6/20/18 to establish care as new patient.  He will follow-up for diabetes management as well as posthospitalization.    On 6/12/18 he is medically stable for discharge.  He has remained afebrile the past 24 hours.  White blood cell count within normal limits.  Less facial swelling.  He is tolerating diet.  No further nausea or vomiting.  He denies chest pain or palpitations.    Physical Exam on Discharge:  /60 (BP Location: Right arm, Patient Position: Lying)   Pulse 80   Temp 97.5 °F (36.4 °C) (Temporal Artery )   Resp 16   Ht 195.6 cm (77\")   Wt (!) 161 kg (354 lb)   SpO2 99%   BMI 41.98 kg/m²   Physical Exam    Condition on Discharge:  Stable and improved    Discharge Disposition:  Home with family    Discharge Diet:   Diet Instructions     Diet: Consistent Carbohydrate    "    Discharge Diet:  Consistent Carbohydrate       diabetic diet    Activity at Discharge:   Activity Instructions     Activity as Tolerated          as tolerated    Discharge Care Plan / Instructions:   1.  Should he have worsening symptoms of left facial swelling with fever he should seek medical attention.  2.  Complete clindamycin after discharge.  3.  May resume metformin 6/13/18.  Hold ×48 hours post contrast on 6/11/18.  4.  Have arranged follow-up appointment with MARGARITA Motta to establish care as new patient.  5.  Need to arrange follow-up with dentist for tooth abscess.    Discharge Medications:     Discharge Medications      New Medications      Instructions Start Date   clindamycin 300 MG capsule  Commonly known as:  CLEOCIN   300 mg, Oral, Every 8 Hours Scheduled      HYDROcodone-acetaminophen 5-325 MG per tablet  Commonly known as:  NORCO   1 tablet, Oral, Every 8 Hours PRN      metFORMIN 500 MG tablet  Commonly known as:  GLUCOPHAGE   500 mg, Oral, 2 Times Daily With Meals, Begin 6/13/18.  Hold for 48 hours post contrast on 6/11/18.           Follow-up Appointments:   Follow-up Information     MARGARITA Louis Follow up.    Specialty:  Family Medicine  Why:  APPOINTMENT ON JUNE 20, 2018 AT 11:15 AM  Contact information:  [address] [place] [address]  [phone]                   Test Results Pending at Discharge: none    The above documentation resulted from a face-to-face encounter by me Joselyn AVILA, [facility].    MARGARITA Melgoza  06/12/18  9:43 AM    Time:  This discharge process required 35 minutes for completion.     Plan discussed with Dr. Martinez and patient.    Time spent in face-to-face evaluation, chart review, planning and education 35 minutes.  Please note that portions of this note may have been completed with a voice recognition program. Efforts were made to edit the dictations, but occasionally words are mistranscribed.    I personally evaluated  the patient in conjunction with MARGARITA Alves and agree with the assessment, treatment plan, and disposition of the patient as recorded by her.       Power Martinez MD  06/12/18  6:46 PM

## 2018-06-12 NOTE — PLAN OF CARE
Problem: Patient Care Overview  Goal: Plan of Care Review  Outcome: Ongoing (interventions implemented as appropriate)   06/12/18 0440   Coping/Psychosocial   Plan of Care Reviewed With patient   Plan of Care Review   Progress improving   OTHER   Outcome Summary Iv abx continued. PRN norco given x 1. NSR on tele. Home today. Continue to monitor.        Problem: Infection, Risk/Actual (Adult)  Goal: Identify Related Risk Factors and Signs and Symptoms  Outcome: Ongoing (interventions implemented as appropriate)    Goal: Infection Prevention/Resolution  Outcome: Ongoing (interventions implemented as appropriate)

## 2018-06-12 NOTE — PAYOR COMM NOTE
"6/11/18 CLINICAL UPDATE    [id number]  UR PHONE    [phone]  Aquilino Acosta (32 y.o. Male)     Date of Birth Social Security Number Address Home Phone MRN    [id number]  [id number] [address] [phone] [id number]    Druze Marital Status          None Single       Admission Date Admission Type Admitting Provider Attending Provider Department, Room/Bed    6/10/18 Emergency Power Martinez MD [place], Glenn Riego, MD [facility] 4B, 403/1    Discharge Date Discharge Disposition Discharge Destination                       Attending Provider:  Power Martinez MD    Allergies:  No Known Allergies    Isolation:  None   Infection:  None   Code Status:  FULL    Ht:  195.6 cm (77\")   Wt:  161 kg (354 lb)    Admission Cmt:  None   Principal Problem:  None                Active Insurance as of 6/10/2018     Primary Coverage     Payor Plan Insurance Group Employer/Plan Group    [id number] [facility] [employer] [facility] PPO [id number]     Payor Plan Address Payor Plan Phone Number Effective From Effective To    [address] [phone] 9/5/2010     [facility] [id number]       Subscriber Name Subscriber Birth Date Member ID       AQUILINO ACOSTA 1985 [id number]                 Emergency Contacts      (Rel.) Home Phone Work Phone Mobile Phone    Anette Acosta (Mother) [phone] -- --                  ICU Vital Signs     Row Name 06/12/18 0745 06/12/18 0714 06/12/18 0412 06/11/18 2343 06/11/18 2100       Vitals    Temp  -- 97.5 °F (36.4 °C) 96.9 °F (36.1 °C) 97 °F (36.1 °C)  --    Temp src  -- Temporal Artery  Temporal Artery  Temporal Artery   --    Pulse  -- 80 75 80  --    Heart Rate Source  -- Monitor Monitor Monitor  --    Resp  -- 16 18 18  --    Resp Rate Source  -- Visual Visual Visual  --    BP  -- 124/60 145/64 124/58  --    BP Location  -- Right arm Left arm Left arm  --    BP Method  -- Automatic Automatic " Automatic  --    Patient Position  -- Lying Lying Lying  --       Oxygen Therapy    SpO2  -- 99 % 97 % 98 % 93 %    Pulse Oximetry Type  -- Intermittent Intermittent Intermittent Intermittent    Device (Oxygen Therapy) room air room air room air room air room air    Row Name 06/11/18 2033 06/11/18 2000 06/11/18 1608 06/11/18 1110 06/11/18 0949       Height and Weight    Weight (!)  161 kg (354 lb)  --  --  --  --    Weight Method Standing scale  --  --  --  --       Vitals    Temp 96.9 °F (36.1 °C)  -- 98.4 °F (36.9 °C) 97 °F (36.1 °C)  --    Temp src Temporal Artery   -- Temporal Artery  Temporal Artery   --    Pulse 98  -- 98 95 103    Heart Rate Source Monitor  -- Monitor Monitor Monitor    Resp 18  -- 18 20  --    Resp Rate Source Visual  -- Visual Visual  --    /72  -- 116/58 135/67  --    BP Location Left arm  -- Left arm Left arm  --    BP Method Automatic  -- Automatic Automatic  --    Patient Position Lying  -- Lying Lying  --       Oxygen Therapy    SpO2 93 %  -- 95 % 96 % 96 %    Pulse Oximetry Type Intermittent  --  -- Intermittent Intermittent    Device (Oxygen Therapy) room air room air room air room air room air    Row Name 06/11/18 0900                   Oxygen Therapy    Device (Oxygen Therapy) room air            Intake & Output (last day)       06/11 0701 - 06/12 0700 06/12 0701 - 06/13 0700    P.O. 480     IV Piggyback 100     Total Intake(mL/kg) 580 (3.6)     Urine (mL/kg/hr)      Total Output        Net +580                Lines, Drains & Airways    Active LDAs     Name:   Placement date:   Placement time:   Site:   Days:    Peripheral IV 06/11/18 1001 Left Wrist  06/11/18    1001    Wrist    less than 1                Hospital Medications (active)       Dose Frequency Start End    clindamycin (CLEOCIN) 600 mg in dextrose 5% 50 mL IVPB (premix) 600 mg Every 6 Hours 6/11/2018 6/18/2018    Sig - Route: Infuse 50 mL into a venous catheter Every 6 (Six) Hours. - Intravenous    dextrose (D50W)  solution 25 g 25 g Every 15 Minutes PRN 6/11/2018     Sig - Route: Infuse 50 mL into a venous catheter Every 15 (Fifteen) Minutes As Needed for Low Blood Sugar (Blood Sugar Less Than 70). - Intravenous    dextrose (GLUTOSE) oral gel 15 g 15 g Every 15 Minutes PRN 6/11/2018     Sig - Route: Take 15 g by mouth Every 15 (Fifteen) Minutes As Needed for Low Blood Sugar (Blood sugar less than 70). - Oral    enoxaparin (LOVENOX) syringe 40 mg 40 mg Every 12 Hours 6/11/2018     Sig - Route: Inject 0.4 mL under the skin Every 12 (Twelve) Hours. - Subcutaneous    famotidine (PEPCID) tablet 40 mg 40 mg Daily 6/11/2018     Sig - Route: Take 2 tablets by mouth Daily. - Oral    glucagon (human recombinant) (GLUCAGEN DIAGNOSTIC) injection 1 mg 1 mg As Needed 6/11/2018     Sig - Route: Inject 1 mg under the skin As Needed (Blood Glucose Less Than 70). - Subcutaneous    HYDROcodone-acetaminophen (NORCO) 5-325 MG per tablet 1 tablet 1 tablet Every 6 Hours PRN 6/11/2018 6/21/2018    Sig - Route: Take 1 tablet by mouth Every 6 (Six) Hours As Needed for Moderate Pain . - Oral    insulin lispro (humaLOG) injection 2-7 Units 2-7 Units 4 Times Daily With Meals & Nightly 6/11/2018     Sig - Route: Inject 2-7 Units under the skin 4 (Four) Times a Day With Meals & at Bedtime. - Subcutaneous    iopamidol (ISOVUE-300) 61 % injection 100 mL 100 mL Once in Imaging 6/11/2018 6/11/2018    Sig - Route: Infuse 100 mL into a venous catheter Once. - Intravenous    iopamidol (ISOVUE-300) 61 % injection 150 mL 150 mL Once in Imaging 6/11/2018     Sig - Route: Infuse 150 mL into a venous catheter Once. - Intravenous    meperidine (DEMEROL) injection 50 mg 50 mg Every 3 Hours PRN 6/11/2018 6/14/2018    Sig - Route: Infuse 1 mL into a venous catheter Every 3 (Three) Hours As Needed for Severe Pain . - Intravenous    methylPREDNISolone sodium succinate (SOLU-Medrol) injection 60 mg 60 mg Once 6/11/2018 6/11/2018    Sig - Route: Infuse 0.96 mL into a venous  catheter 1 (One) Time. - Intravenous    ondansetron (ZOFRAN) injection 4 mg 4 mg Every 6 Hours PRN 6/11/2018     Sig - Route: Infuse 2 mL into a venous catheter Every 6 (Six) Hours As Needed for Nausea or Vomiting. - Intravenous    sodium chloride 0.9 % flush 1-10 mL 1-10 mL As Needed 6/11/2018     Sig - Route: Infuse 1-10 mL into a venous catheter As Needed for Line Care. - Intravenous    sodium chloride 0.9 % with KCl 20 mEq/L infusion 75 mL/hr Continuous 6/11/2018     Sig - Route: Infuse 75 mL/hr into a venous catheter Continuous. - Intravenous          Lab Results (last 24 hours)     Procedure Component Value Units Date/Time    POC Glucose Once [[id number]]  (Abnormal) Collected:  06/12/18 0716    Specimen:  Blood Updated:  06/12/18 0727     Glucose 248 (H) mg/dL      Comment: : [id number] John Cagetie MMeter ID: [id number]       Blood Culture With JESUS - Blood, [[id number]]  (Normal) Collected:  06/11/18 0022    Specimen:  Blood from Arm, Left Updated:  06/12/18 0045     Blood Culture No growth at 24 hours    Blood Culture With JESUS - Blood, [[id number]]  (Normal) Collected:  06/11/18 0015    Specimen:  Blood from Arm, Right Updated:  06/12/18 0045     Blood Culture No growth at 24 hours    POC Glucose Once [[id number]]  (Abnormal) Collected:  06/11/18 2004    Specimen:  Blood Updated:  06/11/18 2016     Glucose 269 (H) mg/dL      Comment: : [id number] Alexsandra TerryMeter ID: [id number]       POC Glucose Once [[id number]]  (Abnormal) Collected:  06/11/18 1613    Specimen:  Blood Updated:  06/11/18 1643     Glucose 210 (H) mg/dL      Comment: : [id number] Peyton PolobyMeter ID: [id number]       POC Glucose Once [[id number]]  (Abnormal) Collected:  06/11/18 1234    Specimen:  Blood Updated:  06/11/18 1307     Glucose 241 (H) mg/dL      Comment: : [id number] Rachel Heath ID: [id number]       POC Glucose Once [[id number]]  (Abnormal) Collected:  06/11/18 0920    Specimen:  Blood Updated:  06/11/18  0931     Glucose 282 (H) mg/dL      Comment: : [id number] Rachel Heath ID: [id number]           Imaging Results (last 24 hours)     Procedure Component Value Units Date/Time    CT Facial Bones With & Without Contrast [[id number]] Collected:  06/11/18 1351     Updated:  06/11/18 1407    Narrative:       EXAMINATION:  CT FACIAL BONES W WO CONTRAST-  6/11/2018 10:25 AM CDT     HISTORY: K04.7-Periapical abscess without sinus; R65.10-Systemic  inflammatory response syndrome (sirs) of non-infectious origin without  acute organ dysfunction. Mass, lump or swelling of the face.      COMPARISON: No comparison study.     TECHNIQUE: Spiral CT was performed of the face before and after IV  contrast administration. Sagittal and coronal images were reconstructed.  DLP: 1315 mGy-cm.     FINDINGS: The parotid and submandibular glands are symmetric. There are  small level 1 and level 2 lymph nodes that do not appear to be  pathologically enlarged. There is prominence of the lingual tonsils at  the base of the tongue. There appears to be some edema within the soft  tissues adjacent to the maxilla on the left side. A fluid collection is  not seen to suggest abscess. There is lucency around the lateral incisor  tooth root on the left side in the maxilla. There is also lucency around  the first molar in the right mandible. There is a large dental caries  involving the right second maxillary premolar. There are no air-fluid  levels within the paranasal sinuses.       Impression:       1. Mild edema in the left face adjacent to the left maxilla. There is a  lucency surrounding the left maxillary lateral incisor tooth root that  could represent a tooth root abscess.  2. Lucency around the tooth roots of first molar in the right mandible  that could represent periapical abscess.  3. Dental caries.  4. Mass effect at the tongue base in the midline most likely represents  hypertrophic lingual tonsillar tissue. There are small lymph  nodes in  the neck bilaterally. Lymph nodes are probably reactive.  This report was finalized on 06/11/2018 14:04 by Dr. August Michaels MD.        Orders (last 24 hrs)     Start     Ordered    06/12/18 0728  POC Glucose Once  Once      06/12/18 0727    06/12/18 0600  Urinalysis With / Microscopic If Indicated (No Culture) - Urine, Clean Catch  Once      06/11/18 0201    06/12/18 0600  Urine Drug Screen - Urine, Clean Catch  Once      06/11/18 0201    06/11/18 2017  POC Glucose Once  Once      06/11/18 2016 06/11/18 1700  methylPREDNISolone sodium succinate (SOLU-Medrol) injection 60 mg  Once      06/11/18 1627    06/11/18 1644  POC Glucose Once  Once      06/11/18 1643    06/11/18 1627  HYDROcodone-acetaminophen (NORCO) 5-325 MG per tablet 1 tablet  Every 6 Hours PRN      06/11/18 1627    06/11/18 1308  POC Glucose Once  Once      06/11/18 1307    06/11/18 1115  iopamidol (ISOVUE-300) 61 % injection 150 mL  Once in Imaging      06/11/18 1032    06/11/18 1115  iopamidol (ISOVUE-300) 61 % injection 100 mL  Once in Imaging      06/11/18 1033    06/11/18 0932  POC Glucose Once  Once      06/11/18 0931    06/11/18 0900  famotidine (PEPCID) tablet 40 mg  Daily      06/11/18 0201    06/11/18 0845  sodium chloride 0.9 % with KCl 20 mEq/L infusion  Continuous      06/11/18 0813    06/11/18 0800  insulin lispro (humaLOG) injection 2-7 Units  4 Times Daily With Meals & Nightly      06/11/18 0201    06/11/18 0700  POC Glucose 4x Daily AC & at Bedtime  4 Times Daily Before Meals & at Bedtime      06/11/18 0201    06/11/18 0600  enoxaparin (LOVENOX) syringe 40 mg  Every 12 Hours      06/11/18 0237    06/11/18 0300  clindamycin (CLEOCIN) 600 mg in dextrose 5% 50 mL IVPB (premix)  Every 6 Hours      06/11/18 0201    06/11/18 0245  sodium chloride 0.9 % infusion  Continuous,   Status:  Discontinued      06/11/18 0201    06/11/18 0239  meperidine (DEMEROL) injection 50 mg  Every 3 Hours PRN      06/11/18 0240    06/11/18 0201   sodium chloride 0.9 % flush 1-10 mL  As Needed      06/11/18 0201    06/11/18 0201  Troponin  Now Then Every 6 Hours,   Status:  Canceled      06/11/18 0201    06/11/18 0201  ondansetron (ZOFRAN) injection 4 mg  Every 6 Hours PRN      06/11/18 0201    06/11/18 0201  dextrose (GLUTOSE) oral gel 15 g  Every 15 Minutes PRN      06/11/18 0201    06/11/18 0201  dextrose (D50W) solution 25 g  Every 15 Minutes PRN      06/11/18 0201    06/11/18 0201  glucagon (human recombinant) (GLUCAGEN DIAGNOSTIC) injection 1 mg  As Needed      06/11/18 0201    --  SCANNED - TELEMETRY        06/10/18 0000    --  SCANNED EKG      06/10/18 0000          Operative/Procedure Notes (last 72 hours) (Notes from 6/9/2018  8:38 AM through 6/12/2018  8:38 AM)     No notes of this type exist for this encounter.        Physician Progress Notes (last 24 hours) (Notes from 6/11/2018  8:38 AM through 6/12/2018  8:38 AM)     No notes of this type exist for this encounter.        Consult Notes (last 24 hours) (Notes from 6/11/2018  8:38 AM through 6/12/2018  8:38 AM)     No notes of this type exist for this encounter.        Power Martinez MD Physician Signed Medicine  Progress Notes Date of Service: 6/11/2018  7:55 AM         []Manual[]Template  []Copied       [facility] Medicine Services  INPATIENT PROGRESS NOTE     Length of Stay: 1  Date of Admission: 6/10/2018  Primary Care Physician: Fabio Pan MD     Subjective   Chief Complaint: left facial swelling  HPI   Reports left facial swelling that started Friday associated with subjective fever and chills, nausea and vomiting ×2.  Has continued to have facial swelling.  Has used ibuprofen without improvement in symptoms.  He denies any specific tooth pain.  He was noted be tachycardic 120-150 in the ER.  Glucoses elevated 238 270.  He was placed on metformin one year ago but has not taken in over 6 months.  He has not followed regularly by primary care  provider.  He denies chest pain or palpitations.  He received clindamycin, normal saline fluid bolus, regular insulin, morphine and Zofran in the emergency room.     Lying in bed.  No family present.  Mild left facial swelling.  Tender to touch.  Poor dental dilatation.  No visible abscess noted and gum.  He complains of facial and cheek bone pain.  Mild nausea.  No vomiting.     Review of Systems   Constitutional: Positive for chills and fever.   HENT: Positive for dental problem and facial swelling (Left). Negative for congestion.    Eyes: Negative for photophobia and visual disturbance.   Respiratory: Negative for shortness of breath and wheezing.    Cardiovascular: Negative for chest pain, palpitations and leg swelling.   Gastrointestinal: Positive for nausea and vomiting. Negative for abdominal distention, abdominal pain and diarrhea.   Endocrine: Negative for cold intolerance, heat intolerance and polyuria.   Genitourinary: Negative for dysuria, hematuria and urgency.   Musculoskeletal: Positive for neck pain.   Skin: Negative for wound.   Allergic/Immunologic: Negative for immunocompromised state.   Neurological: Positive for weakness.   Hematological: Negative for adenopathy. Does not bruise/bleed easily.   Psychiatric/Behavioral: Negative for agitation, behavioral problems and confusion.      All pertinent negatives and positives are as above. All other systems have been reviewed and are negative unless otherwise stated.      Objective    Temp:  [97.7 °F (36.5 °C)-100.7 °F (38.2 °C)] 97.9 °F (36.6 °C)  Heart Rate:  [] 97  Resp:  [18-26] 20  BP: (124-162)/(39-80) 149/66  Physical Exam   Constitutional: He is oriented to person, place, and time. He appears well-developed and well-nourished.   HENT:   Head: Normocephalic and atraumatic.   Mild left facial swelling with erythema   Eyes: EOM are normal. Pupils are equal, round, and reactive to light.   Neck: Normal range of motion. Neck supple.    Cardiovascular: Regular rhythm, normal heart sounds and intact distal pulses.  Exam reveals no gallop and no friction rub.    No murmur heard.  Sinus tach 100-112 on telemetry   Pulmonary/Chest: Effort normal and breath sounds normal. No respiratory distress. He has no wheezes. He has no rales.   Abdominal: Soft. Bowel sounds are normal. He exhibits no distension. There is no tenderness.   Genitourinary:   Genitourinary Comments: Voiding   Musculoskeletal: He exhibits edema (Left facial).   Neurological: He is alert and oriented to person, place, and time.   Skin: Skin is warm and dry. There is erythema (Mild erythema left facial region).   Psychiatric: He has a normal mood and affect. His behavior is normal. Judgment and thought content normal.      Results Review:  I have reviewed the labs, radiology results, and diagnostic studies.     Laboratory Data:      Results from last 7 days  Lab Units 06/11/18  0601 06/10/18  1440   WBC 10*3/mm3 9.07 9.25   HEMOGLOBIN g/dL 12.2* 14.0   HEMATOCRIT % 35.6* 40.0   PLATELETS 10*3/mm3 173 170            Results from last 7 days  Lab Units 06/11/18  0601 06/10/18  1440   SODIUM mmol/L 133* 131*   POTASSIUM mmol/L 3.7 3.6   CHLORIDE mmol/L 98 96*   CO2 mmol/L 25.0 22.0*   BUN mg/dL 5 12   CREATININE mg/dL 0.60 0.55   CALCIUM mg/dL 7.9* 8.7   BILIRUBIN mg/dL 1.0 1.3*   ALK PHOS U/L 76 109   ALT (SGPT) U/L 37 47   AST (SGOT) U/L 38 64*   GLUCOSE mg/dL 236* 325*                 Imaging Results (all)      Procedure Component Value Units Date/Time     CT Angiogram Chest With Contrast [[id number]] Collected:  06/10/18 1708       Updated:  06/10/18 1717     Narrative:        CT ANGIOGRAM CHEST W CONTRAST- 6/10/2018 4:47 PM CDT      HISTORY: soa, tachycardia      COMPARISON: Chest x-ray dated 6/10/2018.      DOSE LENGTH PRODUCT: 1076 mGy cm. Automated exposure control was also  utilized to decrease patient radiation dose.     TECHNIQUE: Helical tomographic images of the chest were  obtained after  the administration of intravenous contrast following angiogram protocol.  Additionally, 3D and multiplanar reformatted images were provided.        FINDINGS:    Pulmonary arteries: There is adequate enhancement of the pulmonary  arteries to evaluate for central and segmental pulmonary emboli. There  are no filling defects within the main, lobar, segmental or visualized  subsegmental pulmonary arteries. The pulmonary arteries are within  normal limits for size.      Aorta and great vessels: The aorta is well opacified and demonstrates no  dissection or aneurysm. The great vessels are normal in appearance.     Visualized neck base: The imaged portion of the base of the neck appears  unremarkable.      Lungs: No dense areas of lung consolidation are identified. There is a  very mild groundglass attenuation which is seen most prominently in the  dependent areas of the lungs, especially the bilateral lower lobes. No  mass or suspicious pulmonary nodule identified. The trachea and  bronchial tree are patent.      Heart: The heart is normal in size. There is no pericardial effusion.      Mediastinum and lymph nodes: Slightly prominent lymph nodes are seen in  both nader, the largest of which is seen in the left hilum measuring up  to 11 mm short axis (series 6-image 57). Additionally, there is mild  subcarinal and right level 2 adenopathy, the appearance of which is  nonspecific.      Skeletal and soft tissues: The osseous structures of the thorax and  surrounding soft tissues demonstrate no acute process.     Upper abdomen: The imaged portion of the upper abdomen demonstrates no  acute process. Hepatic steatosis.        Impression:        1. No evidence of pulmonary embolus.  2. No dense area of lung consolidation to suggest typical pneumonia.  3. There are subtle areas of groundglass attenuation in both lungs which  could reflect an underlying bronchiolitis or could perhaps be secondary  to atelectasis.  Additionally, mild hilar and mediastinal adenopathy are  seen which are favored to be reactive.  This report was finalized on 06/10/2018 17:14 by Dr Gurvinder Tao, .     XR Chest 1 View [[id number]] Collected:  06/10/18 1451       Updated:  06/10/18 1455     Narrative:        EXAMINATION: XR CHEST 1 VW-. 6/10/2018 2:51 PM CDT     CHEST, ONE VIEW:     HISTORY: Tachycardia     COMPARISON: 10/30/2012     A single frontal chest radiograph was obtained.     FINDINGS: The level of inspiration is relatively shallow and lung  volumes mildly diminished.     The lungs are clear without infiltrates.     The heart is normal in size, pulmonary circulation appropriate, without  heart failure.     The bony structures are intact.     Radiographically, chest is unchanged.                                      Impression:        1. No acute cardiopulmonary process.     This report was finalized on 06/10/2018 14:52 by Dr. Griffin Lopez MD.             Intake/Output     Intake/Output Summary (Last 24 hours) at 06/11/18 0756  Last data filed at 06/11/18 0200    Gross per 24 hour   Intake                0 ml   Output             2675 ml   Net            -2675 ml         Scheduled Meds     clindamycin 600 mg Intravenous Q6H   enoxaparin 40 mg Subcutaneous Q12H   famotidine 40 mg Oral Daily   insulin lispro 2-7 Units Subcutaneous 4x Daily With Meals & Nightly         I have reviewed the patient current medications.      Assessment/Plan          Hospital Problem List      Dental infection          Assessment:  1.  Left facial swelling  2.  Suspect dental infection  3.  Diabetes mellitus type II with hyperglycemia  A1c 9.9 (6/11/18)  4.  Hyponatremia, improving  5.  Sinus tachycardia  6.  Systemic inflammatory response syndrome     Plan:  1.  CT facial bones with without contrast.  Discussed with radiology and this will assess oh bone and soft tissue  2.  Clindamycin 600 mg IV every 6 hours  3.  Normal saline at 125 mL per hour.  Will decrease  to 75 mL per hour and add 20 mEq of potassium per liter  4.  Accu-Cheks before meals and at bedtime with sliding scale insulin coverage.  He had been diagnosed as diabetic one year ago.  However, he has not taken metformin in at least 6 months.  Hold metformin at present as patient is having CT contrast  5.  Lovenox for deep vein thrombosis prophylaxis  6.  Check A1c 9.9  7.  Basic metabolic panel tomorrow to monitor hyponatremia     The above documentation resulted from a face-to-face encounter by me Joselyn AVILA, [facility].        Discharge Planning: I expect patient to be discharged to home in 1-2 days.     MARGARITA Melgoza   06/11/18   7:56 AM     I personally evaluated and examined the patient in conjunction with MARGARITA Alves and agree with the assessment, treatment plan, and disposition of the patient as recorded by her. My history, exam, and further recommendations are:      32-year-old  man who has known history of diabetes presenting with dental pain and nausea.  On exam I noticed that he has some swelling on his left side of his face.  He underwent CT of the facial bones that revealed mild edema in the left base adjacent to the left maxilla.  There is lucency surrounding the left maxillary lateral incisor tooth root that could represent a tooth root abscess.Lucency around the tooth roots of first molar in the right mandible  that could represent periapical abscess.  3. Dental caries.  4. Mass effect at the tongue base in the midline most likely represents  hypertrophic lingual tonsillar tissue. There are small lymph nodes in  the neck bilaterally. Lymph nodes are probably reactive  Physical exam correlates with above findings.      Vitals:     06/11/18 1608   BP: 116/58   Pulse: 98   Resp: 18   Temp: 98.4 °F (36.9 °C)   SpO2: 95%      White count is normal at 9.07, hemoglobin 12.2, platelet of 173; BUN 5, creatinine 0.60, glucose 236, serum sodium 133, potassium 3.7  HemoGlobin A1c is  9.9%  He had elevated d-dimer.  CT angiogram the chest performed with no evidence of pulmonary embolus     MAXIMUM TEMPERATURE of 100.7, heart rate of 122-128  Observed overnight, give a dose of Solu-Medrol, continue on IV antibiotic, if remains stable and blood culture showed no growth I anticipate that he can be discharged home tomorrow with recommendation to follow-up with the dentist.  He will need to continue on antibiotic     Power Martinez MD  06/11/18  4:24 PM         Revision History

## 2018-06-12 NOTE — PAYOR COMM NOTE
"Aquilino Acosta (32 y.o. Male)     Date of Birth Social Security Number Address Home Phone MRN    [id number]  [id number]  [facility] [phone] [id number]    [facility] Marital Status          None Single       Admission Date Admission Type Admitting Provider Attending Provider Department, Room/Bed    6/10/18 Emergency Power Martinez MD  [facility] 4B, 403/1    Discharge Date Discharge Disposition Discharge Destination        6/12/2018 Home or Self Care Home             Attending Provider:  (none)   Allergies:  No Known Allergies    Isolation:  None   Infection:  None   Code Status:  Prior    Ht:  195.6 cm (77\")   Wt:  161 kg (354 lb)    Admission Cmt:  None   Principal Problem:  None                Active Insurance as of 6/10/2018     Primary Coverage     Payor Plan Insurance Group Employer/Plan Group    [employer] PPO [id number]     Payor Plan Address Payor Plan Phone Number Effective From Effective To    [address] [phone] 9/5/2010     [facility] [id number]       Subscriber Name Subscriber Birth Date Member ID       AQUILINO ACOSTA 1985 [id number]                 Emergency Contacts      (Rel.) Home Phone Work Phone Mobile Phone    Anette Acosta (Mother) [phone] -- --                                              Discharge Summaries  Cosign Needed   Date of Service: 6/12/2018 9:07 AM            MARGARITA Lucero    Medicine                                  ManualTemplateCopied                                                                                                                                                                                                                                                                                untitled image         [facility] Medicine Services    DISCHARGE SUMMARY               Date of Admission: 6/10/2018    Date of " Discharge:  6/12/2018    Primary Care Physician: Moreno Shankar, APRN         Discharge Diagnoses:          Hospital Problem List                Dental infection                      Discharge diagnoses     1.  Left facial swelling    2.  Suspect dental infection left maxillary lateral incisor tooth root abscess, tooth roots first molar right mandible likely periapical abscess    3.  Dental caries    4.  Diabetes mellitus type II with hyperglycemia  A1c 9.9 (6/11/18)    5.  Hyponatremia, improving    6.  Sinus tachycardia, resolved    7.  Systemic inflammatory response syndrome    Presenting Problem/History of Present Illness:    Dental infection [K04.7]                   Chief Complaint on Day of Discharge: Feels better.  Less facial swelling    History of Present Illness on Day of Discharge:     Lying in bed.  No family present.  Less facial swelling left side noted.  He is swallowing better.  He feels better after receiving dose of steroids and IV antibiotics.  He denies nausea, vomiting or abdominal pain.  He denies chest pain, palpitations or shortness of breath.  He tells me he has metformin at home 500 mg twice daily but has not been taking recently.  He plans to follow-up with Dr. Allen, dentist after discharge.         Consults: None         Procedures Performed: none         Pertinent Test Results:          Results from last 7 days      Lab     Units     06/11/18  0601     06/10/18  1440       WBC     10*3/mm3     9.07     9.25       HEMOGLOBIN     g/dL     12.2*     14.0       HEMATOCRIT     %     35.6*     40.0       PLATELETS     10*3/mm3     173     170                      Results from last 7 days      Lab     Units     06/11/18  0601     06/10/18  1440       SODIUM     mmol/L     133*     131*       POTASSIUM     mmol/L     3.7     3.6       CHLORIDE     mmol/L     98     96*       CO2     mmol/L     25.0     22.0*       BUN     mg/dL     5     12       CREATININE     mg/dL     0.60     0.55        CALCIUM     mg/dL     7.9*     8.7       BILIRUBIN     mg/dL     1.0     1.3*       ALK PHOS     U/L     76     109       ALT (SGPT)     U/L     37     47       AST (SGOT)     U/L     38     64*       GLUCOSE     mg/dL     236*     325*                     Lactic Acid, Plasma [[id number]] (Normal)     Collected: 06/11/18 0601       Lab Status: Final result     Specimen: Blood     Updated: 06/11/18 0650             Lactate     1.0     mmol/L             Magnesium [[id number]] (Normal)     Collected: 06/11/18 0601       Lab Status: Final result     Specimen: Blood     Updated: 06/11/18 0642             Magnesium     1.7     mg/dL             Phosphorus [[id number]] (Normal)     Collected: 06/11/18 0601       Lab Status: Final result     Specimen: Blood     Updated: 06/11/18 0642             Phosphorus     3.6     mg/dL             Lipid Panel [[id number]] (Abnormal)     Collected: 06/11/18 0601       Lab Status: Final result     Specimen: Blood     Updated: 06/11/18 0653             Total Cholesterol     108 (L)     mg/dL                   Triglycerides     207 (H)     mg/dL                   HDL Cholesterol     17 (L)     mg/dL                   LDL Cholesterol      55     mg/dL                   LDL/HDL Ratio     2.92       Hemoglobin A1c [[id number]]     Collected: 06/11/18 0601       Lab Status: Final result     Specimen: Blood     Updated: 06/11/18 0709             Hemoglobin A1C     9.9     %             Narrative:         Less than 6.0           Non-Diabetic Range  6.0-7.0                 ADA Therapeutic Target  Greater than 7.0        Action Suggested       TSH [[id number]] (Normal)     Collected: 06/11/18 0601       Lab Status: Final result     Specimen: Blood     Updated: 06/11/18 0713             TSH     0.816     mIU/mL             T4, Free [[id number]] (Normal)     Collected: 06/11/18 0601       Lab Status: Final result     Specimen: Blood     Updated: 06/11/18 0659             Free T4     1.44     ng/dL              Troponin [[id number]] (Normal)     Collected: 06/11/18 0236       Lab Status: Final result     Specimen: Blood     Updated: 06/11/18 0427             Troponin I     <0.012     ng/mL             Osmolality, Serum [[id number]] (Abnormal)     Collected: 06/11/18 0236       Lab Status: Final result     Specimen: Blood     Updated: 06/11/18 0450             Osmolality     277 (L)     mOsm/kg             Sodium, Urine, Random - Urine, Clean Catch [[id number]] (Normal)     Collected: 06/11/18 0217       Lab Status: Final result     Specimen: Urine from Urine, Clean Catch     Updated: 06/11/18 0308             Sodium, Urine     49     mmol/L             Osmolality, Urine - Urine, Clean Catch [[id number]] (Abnormal)     Collected: 06/11/18 0217       Lab Status: Final result     Specimen: Urine from Urine, Clean Catch     Updated: 06/11/18 0450             Osmolality, Urine     379 (L)     mOsm/kg             Blood Culture With JESSU - Blood, [[id number]] (Normal)     Collected: 06/11/18 0022       Lab Status: Preliminary result     Specimen: Blood from Arm, Left     Updated: 06/12/18 0045             Blood Culture     No growth at 24 hours       Lactic Acid, Plasma [[id number]] (Normal)     Collected: 06/11/18 0020       Lab Status: Final result     Specimen: Blood     Updated: 06/11/18 0045             Lactate     1.3     mmol/L             Blood Culture With JESUS - Blood, [[id number]] (Normal)     Collected: 06/11/18 0015       Lab Status: Preliminary result     Specimen: Blood from Arm, Right     Updated: 06/12/18 0045             Blood Culture     No growth at 24 hours       Urinalysis With / Culture If Indicated - Urine, Clean Catch [[id number]] (Abnormal)     Collected: 06/11/18 0000       Lab Status: Final result     Specimen: Urine from Urine, Clean Catch     Updated: 06/11/18 0020             Color, UA     Yellow             Appearance, UA     Clear             pH, UA     5.5             Specific [place], UA      1.027             Glucose, UA     >=1000 mg/dL (3+) (A)             Ketones, UA     80 mg/dL (3+) (A)             Bilirubin, UA     Negative             Blood, UA     Negative             Protein, UA     30 mg/dL (1+) (A)             Leuk Esterase, UA     Negative             Nitrite, UA     Negative             Urobilinogen, UA     0.2 E.U./dL       Urine Drug Screen - Urine, Clean Catch [[id number]] (Abnormal)     Collected: 06/11/18 0000       Lab Status: Final result     Specimen: Urine from Urine, Clean Catch     Updated: 06/11/18 0039             Amphetamine Screen, Urine     Negative             Barbiturates Screen, Urine     Negative             Benzodiazepine Screen, Urine     Negative             Cocaine Screen, Urine     Negative             Methadone Screen, Urine     Negative             Opiate Screen     Positive (A)             Phencyclidine (PCP), Urine     Negative             THC, Screen, Urine     Negative                     Urinalysis, Microscopic Only - Urine, Clean Catch [[id number]] (Abnormal)     Collected: 06/11/18 0000       Lab Status: Final result     Specimen: Urine from Urine, Clean Catch     Updated: 06/11/18 0020             RBC, UA     3-5 (A)     /HPF                   WBC, UA     0-2 (A)     /HPF                   Bacteria, UA     None Seen     /HPF                   Squamous Epithelial Cells, UA     None Seen     /HPF                   Hyaline Casts, UA     None Seen     /LPF                   Methodology     Automated Microscopy       Blood Gas, Venous [[id number]] (Abnormal)     Collected: 06/10/18 2015       Lab Status: Final result     Specimen: Venous Blood     Updated: 06/10/18 2026             Site     OTHER             pH, Venous     7.357     pH Units                   pCO2, Venous     41.9     mm Hg                   pO2, Venous     36.8     mm Hg                   HCO3, Venous     23.5     mmol/L                   Base Excess, Venous     -2.0 (L)     mmol/L                    O2 Saturation, Venous     68.0     %                   Temperature     37.0     C                   Barometric Pressure for Blood Gas     746     mmHg                   Modality     Room Air             Ventilator Mode     NA             Collected by     [id number]       Magnesium [[id number]] (Normal)     Collected: 06/10/18 2006       Lab Status: Final result     Specimen: Blood     Updated: 06/10/18 2024             Magnesium     1.5     mg/dL             Phosphorus [[id number]] (Normal)     Collected: 06/10/18 2006       Lab Status: Final result     Specimen: Blood     Updated: 06/10/18 2024             Phosphorus     3.3     mg/dL             Osmolality, Serum [[id number]] (Abnormal)     Collected: 06/10/18 2006       Lab Status: Final result     Specimen: Blood     Updated: 06/10/18 2040             Osmolality     283 (L)     mOsm/kg             TSH [[id number]] (Normal)     Collected: 06/10/18 2006       Lab Status: Final result     Specimen: Blood     Updated: 06/10/18 2055             TSH     0.707     mIU/mL             T3, Free [[id number]] (Normal)     Collected: 06/10/18 2006       Lab Status: Final result     Specimen: Blood     Updated: 06/10/18 2042             T3, Free     3.97     pg/mL             T4, Free [[id number]] (Normal)     Collected: 06/10/18 2006       Lab Status: Final result     Specimen: Blood     Updated: 06/10/18 2042             Free T4     1.44     ng/dL             CK [[id number]] (Normal)     Collected: 06/10/18 1440       Lab Status: Final result     Specimen: Blood     Updated: 06/10/18 1504             Creatine Kinase     58     U/L             CK-MB [[id number]] (Normal)     Collected: 06/10/18 1440       Lab Status: Final result     Specimen: Blood     Updated: 06/10/18 1513             CKMB     <0.22     ng/mL             Narrative:         CKMB Index not indicated       Myoglobin, Serum [[id number]] (Normal)     Collected: 06/10/18 1440       Lab Status: Final result      Specimen: Blood     Updated: 06/10/18 1513             Myoglobin     19.3     ng/mL                           D-dimer, Quantitative [[id number]] (Abnormal)     Collected: 06/10/18 1440       Lab Status: Final result     Specimen: Blood     Updated: 06/10/18 1505             D-Dimer, Quantitative     1.98 (H)     mg/L (FEU)             Narrative:         Reference Range is 0-0.50 mg/L FEU. However, results <0.50 mg/L FEU tends to rule out DVT or PE. Results >0.50 mg/L FEU are not useful in predicting absence or presence of DVT or PE.       Troponin [[id number]] (Normal)     Collected: 06/10/18 1440       Lab Status: Final result     Specimen: Blood     Updated: 06/10/18 1513             Troponin I     <0.012     ng/mL                  Culture Data:             Blood Culture       Date     Value     Ref Range     Status       06/11/2018     No growth at 24 hours           Preliminary       06/11/2018     No growth at 24 hours           Preliminary                      Imaging Results (all)                    Procedure       Component       Value       Units       Date/Time               CT Facial Bones With & Without Contrast [[id number]]     Collected:  06/11/18 1351                   Updated:  06/11/18 1407             Narrative:                EXAMINATION:  CT FACIAL BONES W WO CONTRAST-  6/11/2018 10:25 AM CDT         HISTORY: K04.7-Periapical abscess without sinus; R65.10-Systemic    inflammatory response syndrome (sirs) of non-infectious origin without    acute organ dysfunction. Mass, lump or swelling of the face.          COMPARISON: No comparison study.         TECHNIQUE: Spiral CT was performed of the face before and after IV    contrast administration. Sagittal and coronal images were reconstructed.    DLP: 1315 mGy-cm.         FINDINGS: The parotid and submandibular glands are symmetric. There are    small level 1 and level 2 lymph nodes that do not appear to be    pathologically enlarged. There is  prominence of the lingual tonsils at    the base of the tongue. There appears to be some edema within the soft    tissues adjacent to the maxilla on the left side. A fluid collection is    not seen to suggest abscess. There is lucency around the lateral incisor    tooth root on the left side in the maxilla. There is also lucency around    the first molar in the right mandible. There is a large dental caries    involving the right second maxillary premolar. There are no air-fluid    levels within the paranasal sinuses.                  Impression:                1. Mild edema in the left face adjacent to the left maxilla. There is a    lucency surrounding the left maxillary lateral incisor tooth root that    could represent a tooth root abscess.    2. Lucency around the tooth roots of first molar in the right mandible    that could represent periapical abscess.    3. Dental caries.    4. Mass effect at the tongue base in the midline most likely represents    hypertrophic lingual tonsillar tissue. There are small lymph nodes in    the neck bilaterally. Lymph nodes are probably reactive.    This report was finalized on 06/11/2018 14:04 by Dr. August Michaels MD.             CT Angiogram Chest With Contrast [[id number]]     Collected:  06/10/18 1708                   Updated:  06/10/18 1717             Narrative:                CT ANGIOGRAM CHEST W CONTRAST- 6/10/2018 4:47 PM CDT          HISTORY: soa, tachycardia          COMPARISON: Chest x-ray dated 6/10/2018.          DOSE LENGTH PRODUCT: 1076 mGy cm. Automated exposure control was also    utilized to decrease patient radiation dose.         TECHNIQUE: Helical tomographic images of the chest were obtained after    the administration of intravenous contrast following angiogram protocol.    Additionally, 3D and multiplanar reformatted images were provided.            FINDINGS:      Pulmonary arteries: There is adequate enhancement of the pulmonary    arteries to  evaluate for central and segmental pulmonary emboli. There    are no filling defects within the main, lobar, segmental or visualized    subsegmental pulmonary arteries. The pulmonary arteries are within    normal limits for size.          Aorta and great vessels: The aorta is well opacified and demonstrates no    dissection or aneurysm. The great vessels are normal in appearance.         Visualized neck base: The imaged portion of the base of the neck appears    unremarkable.          Lungs: No dense areas of lung consolidation are identified. There is a    very mild groundglass attenuation which is seen most prominently in the    dependent areas of the lungs, especially the bilateral lower lobes. No    mass or suspicious pulmonary nodule identified. The trachea and    bronchial tree are patent.          Heart: The heart is normal in size. There is no pericardial effusion.          Mediastinum and lymph nodes: Slightly prominent lymph nodes are seen in    both nader, the largest of which is seen in the left hilum measuring up    to 11 mm short axis (series 6-image [id number]). Additionally, there is mild    subcarinal and right level 2 adenopathy, the appearance of which is    nonspecific.          Skeletal and soft tissues: The osseous structures of the thorax and    surrounding soft tissues demonstrate no acute process.         Upper abdomen: The imaged portion of the upper abdomen demonstrates no    acute process. Hepatic steatosis.                  Impression:                1. No evidence of pulmonary embolus.    2. No dense area of lung consolidation to suggest typical pneumonia.    3. There are subtle areas of groundglass attenuation in both lungs which    could reflect an underlying bronchiolitis or could perhaps be secondary    to atelectasis. Additionally, mild hilar and mediastinal adenopathy are    seen which are favored to be reactive.    This report was finalized on 06/10/2018 17:14 by Dr Gurvinder Tao, .              XR Chest 1 View [[id number]]     Collected:  06/10/18 1451                   Updated:  06/10/18 1455             Narrative:                EXAMINATION: XR CHEST 1 VW-. 6/10/2018 2:51 PM CDT         CHEST, ONE VIEW:         HISTORY: Tachycardia         COMPARISON: 10/30/2012         A single frontal chest radiograph was obtained.         FINDINGS: The level of inspiration is relatively shallow and lung    volumes mildly diminished.         The lungs are clear without infiltrates.         The heart is normal in size, pulmonary circulation appropriate, without    heart failure.         The bony structures are intact.         Radiographically, chest is unchanged.                                                  Impression:                1. No acute cardiopulmonary process.         This report was finalized on 06/10/2018 14:52 by Dr. Griffin Lopez MD.                        Hospital Course    Patient is a 32 y.o. male presented to [facility] emergency room 6/10/18 with complaints of left facial swelling that started Friday associated with subjective fever and chills, nausea and vomiting ×2.  Facial swelling continued.  He used ibuprofen without improvement in symptoms.  He was noted have several dental caries.  He was tachycardic 120-150 in the emergency room.  Glucoses elevated 238 270.  Patient has been on metformin in the past but has not taken recently.  He received clindamycin, normal saline fluid bolus, regular insulin, Zofran, and morphine in the emergency room.  Chest x-ray no acute cardiopulmonary process.  D-dimer elevated at 1.98.  CT angiogram the chest no pulmonary embolus.  Dense lung consolidation to suggest typical pneumonia.  Gram glass attenuations both lung could reflect underlying bronchiolitis or atelectasis.  Hilar adenopathy favored to be reactive.         He was admitted to the medical floor with left facial swelling, systemic inflammatory response syndrome, elevated  glucoses.  Clindamycin continued IV.  He was hydrated with IV fluids.  He is placed on telemetry and initially was noted be tachycardic.  However, during hospitalization heart rate has decreased to 80.  CT scan facial bones with and without contrast 6/11/18 noted mild edema in the left face adjacent to the left maxilla.  There is lucency surrounding the left maxillary lateral incisor tooth root that could represent a tooth root abscess. Lucency around the tooth roots of the first molar in the right mandible that could represent periapical abscess.  Dental caries.  Mass effect at the tongue base in the midline most likely represents hypertrophic lingular tonsillar tissue.  Small lymph nodes in the neck bilaterally probably reactive.  He was given a dose of Solu-Medrol IV.  The following morning he was noted to have less facial swelling.  He indicates he feels better and is tolerating diet without difficulty swallowing.  Last temperature elevation 100.7 at 1 AM on date of admission.  Otherwise, he has remained afebrile.  WBC within normal limits.  He will be continued on clindamycin at discharge.  He has been advised to seek care with dentist.  He has seen Dr. Allen in the past and plans to arrange upcoming appointment to address dental caries and tooth abscesses.         He has history of diabetes mellitus type II.  A1c 9.9.  He has taken metformin in the past but has not taken recently.  He tells me that both he and his mother take the same dose of metformin.  Glucoses 236 - 325.  He was placed on sliding scale insulin coverage during hospitalization.  He has been advised to resume metformin 6/13/18.  He should hold 48 hours post contrast on 6/11/18.  Sodium is 131 on admission improved 133 with hydration fluid hydration.         He has no established primary care provider at present.  He tells me his father is followed by [facility].  Have arranged follow up with MARGARITA Motta 6/20/18 to establish care as  "new patient.  He will follow-up for diabetes management as well as posthospitalization.         On 6/12/18 he is medically stable for discharge.  He has remained afebrile the past 24 hours.  White blood cell count within normal limits.  Less facial swelling.  He is tolerating diet.  No further nausea or vomiting.  He denies chest pain or palpitations.         Physical Exam on Discharge:    /60 (BP Location: Right arm, Patient Position: Lying)   Pulse 80   Temp 97.5 °F (36.4 °C) (Temporal Artery )   Resp 16   Ht 195.6 cm (77\")   Wt (!) 161 kg (354 lb)   SpO2 99%   BMI 41.98 kg/m²     Physical Exam         Condition on Discharge:  Stable and improved         Discharge Disposition:  Home with family         Discharge Diet:           Diet Instructions                Diet: Consistent Carbohydrate                Discharge Diet:  Consistent Carbohydrate                  diabetic diet         Activity at Discharge:           Activity Instructions                Activity as Tolerated                     as tolerated         Discharge Care Plan / Instructions:     1.  Should he have worsening symptoms of left facial swelling with fever he should seek medical attention.    2.  Complete clindamycin after discharge.    3.  May resume metformin 6/13/18.  Hold ×48 hours post contrast on 6/11/18.    4.  Have arranged follow-up appointment with MARGARITA Motta to establish care as new patient.    5.  Need to arrange follow-up with dentist for tooth abscess.         Discharge Medications:                   Discharge Medications                      New Medications                    Instructions       Start Date         clindamycin 300 MG capsule    Commonly known as:  CLEOCIN          300 mg, Oral, Every 8 Hours Scheduled                  HYDROcodone-acetaminophen 5-325 MG per tablet    Commonly known as:  NORCO          1 tablet, Oral, Every 8 Hours PRN                  metFORMIN 500 MG tablet    Commonly known as: "  GLUCOPHAGE          500 mg, Oral, 2 Times Daily With Meals, Begin 6/13/18.  Hold for 48 hours post contrast on 6/11/18.                                   Follow-up Appointments:           Follow-up Information                    MARGARITA Louis Follow up.        Specialty:  Family Medicine    Why:  APPOINTMENT ON JUNE 20, 2018 AT 11:15 AM    Contact information:    [address] [place] [address]    [phone]                                                   Test Results Pending at Discharge: none         The above documentation resulted from a face-to-face encounter by me Joselyn AVIAL, [facility].         MARGARITA Melgoza    06/12/18    9:43 AM         Time:    This discharge process required 35 minutes for completion.          Plan discussed with Dr. Boswell and patient.         Time spent in face-to-face evaluation, chart review, planning and education 35 minutes.    Please note that portions of this note may have been completed with a voice recognition program. Efforts were made to edit the dictations, but occasionally words are mistranscribed.                                                   ED to Hosp-Admission (Discharged) on 6/10/2018                                  Detailed Report                                 Note shared with patient                              Discharge Order     Start     Ordered    06/12/18 0939  Discharge patient  Once     Expected Discharge Date:  06/12/18    Discharge Disposition:  Home or Self Care    Physician of Record:  BERNARDO BOSWELL [[id number]]    Physician of Record selection review needed?:  No       Question Answer Comment   Physician of Record BERNARDO BOSWELL    Physician of Record selection review needed? No        06/12/18 0924

## 2018-06-16 LAB
BACTERIA SPEC AEROBE CULT: NORMAL
BACTERIA SPEC AEROBE CULT: NORMAL

## 2019-03-27 ENCOUNTER — OFFICE VISIT (OUTPATIENT)
Dept: RETAIL CLINIC | Facility: CLINIC | Age: 34
End: 2019-03-27

## 2019-03-27 VITALS
RESPIRATION RATE: 16 BRPM | OXYGEN SATURATION: 99 % | TEMPERATURE: 98.7 F | HEART RATE: 92 BPM | WEIGHT: 315 LBS | SYSTOLIC BLOOD PRESSURE: 138 MMHG | BODY MASS INDEX: 42.22 KG/M2 | DIASTOLIC BLOOD PRESSURE: 70 MMHG

## 2019-03-27 DIAGNOSIS — J06.9 ACUTE UPPER RESPIRATORY INFECTION: Primary | ICD-10-CM

## 2019-03-27 DIAGNOSIS — E11.65 TYPE 2 DIABETES MELLITUS WITH HYPERGLYCEMIA, WITHOUT LONG-TERM CURRENT USE OF INSULIN (HCC): ICD-10-CM

## 2019-03-27 LAB — GLUCOSE BLDC GLUCOMTR-MCNC: 243 MG/DL (ref 70–130)

## 2019-03-27 PROCEDURE — 99213 OFFICE O/P EST LOW 20 MIN: CPT | Performed by: NURSE PRACTITIONER

## 2019-03-27 PROCEDURE — 82962 GLUCOSE BLOOD TEST: CPT | Performed by: NURSE PRACTITIONER

## 2019-03-27 RX ORDER — FLUTICASONE PROPIONATE 50 MCG
2 SPRAY, SUSPENSION (ML) NASAL DAILY
Qty: 1 BOTTLE | Refills: 0 | Status: SHIPPED | OUTPATIENT
Start: 2019-03-27 | End: 2019-04-05

## 2019-03-27 RX ORDER — DEXTROMETHORPHAN HYDROBROMIDE AND PROMETHAZINE HYDROCHLORIDE 15; 6.25 MG/5ML; MG/5ML
5 SYRUP ORAL 4 TIMES DAILY PRN
Qty: 180 ML | Refills: 0 | Status: SHIPPED | OUTPATIENT
Start: 2019-03-27 | End: 2019-04-05

## 2019-03-27 NOTE — PATIENT INSTRUCTIONS
Type 2 Diabetes Mellitus, Self Care, Adult  Caring for yourself after you have been diagnosed with type 2 diabetes (type 2 diabetes mellitus) means keeping your blood sugar (glucose) under control with a balance of:  · Nutrition.  · Exercise.  · Lifestyle changes.  · Medicines or insulin, if necessary.  · Support from your team of health care providers and others.    The following information explains what you need to know to manage your diabetes at home.  What are the risks?  Having diabetes can put you at risk for other long-term (chronic) conditions, such as heart disease and kidney disease. Your health care provider may prescribe medicines to help prevent complications from diabetes. These medicines may include:  · Aspirin.  · Medicine to lower cholesterol.  · Medicine to control blood pressure.    How to monitor blood glucose  · Check your blood glucose every day, as often as told by your health care provider.  · Have your A1c (hemoglobin A1c) level checked two or more times a year, or as often as told by your health care provider.  Your health care provider will set individualized treatment goals for you. Generally, the goal of treatment is to maintain the following blood glucose levels:  · Before meals (preprandial):  mg/dL (4.4-7.2 mmol/L).  · After meals (postprandial): below 180 mg/dL (10 mmol/L).  · A1c level: less than 7%.    How to manage hyperglycemia and hypoglycemia  Hyperglycemia symptoms  Hyperglycemia, also called high blood glucose, occurs when blood glucose is too high. Make sure you know the early signs of hyperglycemia, such as:  · Increased thirst.  · Hunger.  · Feeling very tired.  · Needing to urinate more often than usual.  · Blurry vision.    Hypoglycemia symptoms  Hypoglycemia, also called low blood glucose, occurs with a blood glucose level at or below 70 mg/dL (3.9 mmol/L). The risk for hypoglycemia increases during or after exercise, during sleep, during illness, and when  skipping meals or not eating for a long time (fasting).  It is important to know the symptoms of hypoglycemia and treat it right away. Always have a 15-gram rapid-acting carbohydrate snack with you to treat low blood glucose. Family members and close friends should also know the symptoms and should understand how to treat hypoglycemia, in case you are not able to treat yourself. Symptoms may include:  · Hunger.  · Anxiety.  · Sweating and feeling clammy.  · Confusion.  · Dizziness or feeling light-headed.  · Sleepiness.  · Nausea.  · Increased heart rate.  · Headache.  · Blurry vision.  · Irritability.  · A change in coordination.  · Tingling or numbness around the mouth, lips, or tongue.  · Restless sleep.  · Fainting.  · Seizure.    Treating hypoglycemia  If you are alert and able to swallow safely, follow the 15:15 rule:  · Take 15 grams of a rapid-acting carbohydrate. Rapid-acting options include:  ? 1 tube of glucose gel.  ? 3 glucose pills.  ? 6-8 pieces of hard candy.  ? 4 oz (120 mL) of fruit juice.  ? 4 oz (120 mL) of regular (not diet) soda.  · Check your blood glucose 15 minutes after you take the carbohydrate.  · If the repeat blood glucose level is still at or below 70 mg/dL (3.9 mmol/L), take 15 grams of a carbohydrate again.  · If your blood glucose level does not increase above 70 mg/dL (3.9 mmol/L) after 3 tries, seek emergency medical care.  · After your blood glucose level returns to normal, eat a meal or a snack within 1 hour.    Treating severe hypoglycemia  Severe hypoglycemia is when your blood glucose level is at or below 54 mg/dL (3 mmol/L). Severe hypoglycemia is an emergency. Do not wait to see if the symptoms will go away. Get medical help right away. Call your local emergency services (911 in the [place]  If you have severe hypoglycemia and you cannot eat or drink, you may need an injection of glucagon. A family member or close friend should learn how to check your blood glucose and how  to give you a glucagon injection. Ask your health care provider if you need to have an emergency glucagon injection kit available.  Severe hypoglycemia may need to be treated in a hospital. The treatment may include getting glucose through an IV. You may also need treatment for the cause of your hypoglycemia.  Follow these instructions at home:  Take diabetes medicines as told  · If your health care provider prescribed insulin or diabetes medicines, take them every day.  · Do not run out of insulin or other diabetes medicines that you take. Plan ahead so you always have these available.  · If you use insulin, adjust your dosage based on how physically active you are and what foods you eat. Your health care provider will tell you how to adjust your dosage.  Make healthy food choices  The things that you eat and drink affect your blood glucose and your insulin dosage. Making good choices helps to control your diabetes and prevent other health problems. A healthy meal plan includes eating lean proteins, complex carbohydrates, fresh fruits and vegetables, low-fat dairy products, and healthy fats.  Make an appointment to see a diet and nutrition specialist (registered dietitian) to help you create an eating plan that is right for you. Make sure that you:  · Follow instructions from your health care provider about eating or drinking restrictions.  · Drink enough fluid to keep your urine pale yellow.  · Keep a record of the carbohydrates that you eat. Do this by reading food labels and learning the standard serving sizes of foods.  · Follow your sick day plan whenever you cannot eat or drink as usual. Make this plan in advance with your health care provider.    Stay active  Exercise regularly, as told by your health care provider. This may include:  · Stretching and doing strength exercises, such as yoga or weightlifting, 2 or more times a week.  · Doing 150 minutes or more of moderate-intensity or vigorous-intensity  exercise each week. This could be brisk walking, biking, or water aerobics.  ? Spread out your activity over 3 or more days of the week.  ? Do not go more than 2 days in a row without doing some kind of physical activity.    When you start a new exercise or activity, work with your health care provider to adjust your insulin, medicines, or food intake as needed.  Make healthy lifestyle choices  · Do not use any tobacco products, such as cigarettes, chewing tobacco, and e-cigarettes. If you need help quitting, ask your health care provider.  · If your health care provider says that alcohol is safe for you, limit alcohol intake to no more than 1 drink per day for nonpregnant women and 2 drinks per day for men. One drink equals 12 oz of beer, 5 oz of wine, or 1½ oz of hard liquor.  · Learn to manage stress. If you need help with this, ask your health care provider.  Care for your body  · Keep your immunizations up to date. In addition to getting vaccinations as told by your health care provider, it is recommended that you get vaccinated against the following illnesses:  ? The flu (influenza). Get a flu shot every year.  ? Pneumonia.  ? Hepatitis B.  · Schedule an eye exam soon after your diagnosis, and then one time every year after that.  · Check your skin and feet every day for cuts, bruises, redness, blisters, or sores. Schedule a foot exam with your health care provider once every year.  · Brush your teeth and gums two times a day, and floss one or more times a day. Visit your dentist one or more times every 6 months.  · Maintain a healthy weight.  General instructions  · Take over-the-counter and prescription medicines only as told by your health care provider.  · Share your diabetes management plan with people in your workplace, school, and household.  · Carry a medical alert card or wear medical alert jewelry.  · Keep all follow-up visits as told by your health care provider. This is important.  Questions to ask  your health care provider  · Do I need to meet with a diabetes educator?  · Where can I find a support group for people with diabetes?  Where to find more information  For more information about diabetes, visit:  · American Diabetes Association (ADA): www.diabetes.org  · American Association of Diabetes Educators (AADE): www.diabeteseducator.org    Summary  · Caring for yourself after you have been diagnosed with (type 2 diabetes mellitus) means keeping your blood sugar (glucose) under control with a balance of nutrition, exercise, lifestyle changes, and medicine.  · Check your blood glucose every day, as often as told by your health care provider.  · Having diabetes can put you at risk for other long-term (chronic) conditions, such as heart disease and kidney disease. Your health care provider may prescribe medicines to help prevent complications from diabetes.  · Keep all follow-up visits as told by your health care provider. This is important.  This information is not intended to replace advice given to you by your health care provider. Make sure you discuss any questions you have with your health care provider.  Document Released: 04/10/2017 Document Revised: 07/20/2018 Document Reviewed: 01/20/2017  [employer] Interactive Patient Education © 2019 [employer]

## 2019-04-05 ENCOUNTER — OFFICE VISIT (OUTPATIENT)
Dept: INTERNAL MEDICINE | Facility: CLINIC | Age: 34
End: 2019-04-05

## 2019-04-05 VITALS
HEART RATE: 76 BPM | BODY MASS INDEX: 37.19 KG/M2 | DIASTOLIC BLOOD PRESSURE: 76 MMHG | RESPIRATION RATE: 12 BRPM | SYSTOLIC BLOOD PRESSURE: 122 MMHG | WEIGHT: 315 LBS | OXYGEN SATURATION: 99 % | HEIGHT: 77 IN | TEMPERATURE: 97.5 F

## 2019-04-05 DIAGNOSIS — E66.01 MORBID OBESITY WITH BMI OF 40.0-44.9, ADULT (HCC): ICD-10-CM

## 2019-04-05 DIAGNOSIS — E11.8 TYPE 2 DIABETES MELLITUS WITH COMPLICATION, UNSPECIFIED WHETHER LONG TERM INSULIN USE: Primary | ICD-10-CM

## 2019-04-05 LAB — HBA1C MFR BLD: 10.5 %

## 2019-04-05 PROCEDURE — 99204 OFFICE O/P NEW MOD 45 MIN: CPT | Performed by: FAMILY MEDICINE

## 2019-04-05 PROCEDURE — 83036 HEMOGLOBIN GLYCOSYLATED A1C: CPT | Performed by: FAMILY MEDICINE

## 2019-04-05 RX ORDER — GLIMEPIRIDE 2 MG/1
2 TABLET ORAL
Qty: 90 TABLET | Refills: 0 | Status: SHIPPED | OUTPATIENT
Start: 2019-04-05 | End: 2022-02-12

## 2019-04-05 NOTE — ASSESSMENT & PLAN NOTE
Uncontrolled  Resume metformin  Add glimepiride  F/u 4 weeks  Referral to nutrition and diabetic education

## 2019-04-05 NOTE — PROGRESS NOTES
"CC:   Chief Complaint   Patient presents with   • Establish Care   • Diabetes       History:  Wyatt Acosta is a 33 y.o. male who presents today for evaluation of the above problems.      Here for DM, present for 3 years diagnosed after getting foot infection on melissa nail. Previously on metformin and glimepiride, he has been out of metformin for 2 months and has not used any medications. Has not been checking his blood sugars.     Says diet is \"not good,\" eats whatever he wants, is drinking mostly water.   Breakfast: burrito  Lunch: skipped today  Last night dinner: lasagna    Both parents have diabetes    Got down to 330 lbs, was over 400 lbs previously.     ROS:  Review of Systems   Constitutional: Negative for chills, fever and unexpected weight change.   Respiratory: Negative for shortness of breath.    Cardiovascular: Negative for chest pain.   Gastrointestinal: Negative for abdominal pain.   Endocrine: Positive for polyuria.   Genitourinary: Negative for difficulty urinating.   Musculoskeletal: Arthralgias: R shoulder pops when he uses it a lot at work.   Psychiatric/Behavioral: Negative for sleep disturbance.   All other systems reviewed and are negative.      Allergies   Allergen Reactions   • Anectine [Succinylcholine] Paresthesia     Past Medical History:   Diagnosis Date   • Diabetes mellitus (CMS/[facility])    • MRSA (methicillin resistant Staphylococcus aureus)    • Otitis media      Past Surgical History:   Procedure Laterality Date   • INCISION AND DRAINAGE ARM     • MASTOID SURGERY  1986   • TONSILLECTOMY     • TYMPANOSTOMY TUBE PLACEMENT       Family History   Problem Relation Age of Onset   • Stroke Mother    • Diabetes Father    • Stroke Sister    • No Known Problems Brother    • No Known Problems Sister    • Dementia Maternal Grandmother       reports that he has never smoked. He has never used smokeless tobacco. He reports that he does not drink alcohol or use drugs.      Current Outpatient " "Medications:   •  metFORMIN (GLUCOPHAGE) 500 MG tablet, Take 1 tablet by mouth 2 (Two) Times a Day With Meals. Begin 6/13/18.  Hold for 48 hours post contrast on 6/11/18., Disp: 60 tablet, Rfl: 0    OBJECTIVE:  /76 (BP Location: Left arm, Patient Position: Sitting, Cuff Size: Large Adult)   Pulse 76   Temp 97.5 °F (36.4 °C) (Temporal)   Resp 12   Ht 195.6 cm (77\")   Wt (!) 161 kg (355 lb 1 oz)   SpO2 99%   BMI 42.10 kg/m²    Physical Exam   Constitutional: He is oriented to person, place, and time. No distress.   HENT:   Head: Normocephalic and atraumatic.   Nose: Nose normal.   Eyes: Conjunctivae are normal. Right eye exhibits no discharge. Left eye exhibits no discharge. No scleral icterus.   Neck: No tracheal deviation present.   Cardiovascular: Normal rate, regular rhythm and normal heart sounds. Exam reveals no gallop and no friction rub.   No murmur heard.  Pulmonary/Chest: Effort normal and breath sounds normal. No respiratory distress. He has no wheezes. He has no rales.   Neurological: He is alert and oriented to person, place, and time.   Skin: Skin is warm and dry. He is not diaphoretic. No pallor.   Psychiatric: He has a normal mood and affect. His behavior is normal. Judgment and thought content normal.   Nursing note and vitals reviewed.      Assessment/Plan    Problem List Items Addressed This Visit        Endocrine    Type 2 diabetes mellitus with complication (CMS/[facility]) - Primary     Uncontrolled  Resume metformin  Add glimepiride  F/u 4 weeks  Referral to nutrition and diabetic education         Relevant Medications    metFORMIN (GLUCOPHAGE) 1000 MG tablet    glimepiride (AMARYL) 2 MG tablet    Other Relevant Orders    POC Glycosylated Hemoglobin (Hb A1C) (Completed)    Ambulatory Referral to Nutrition Services    Ambulatory Referral to Diabetic Education      Other Visit Diagnoses     Morbid obesity with BMI of 40.0-44.9, adult (CMS/[facility])        Relevant Orders    Ambulatory Referral to " Nutrition Services          Patient's Body mass index is 42.1 kg/m². BMI is above normal parameters. Recommendations include: nutrition counseling and referral to a nutritionist.      An After Visit Summary was printed and given to the patient at discharge.  Return in about 4 weeks (around 5/3/2019).         Loy Kerr D.O.  [facility]  Osteopathic Neuromusculoskeletal Medicine  4/5/2019

## 2019-04-05 NOTE — PATIENT INSTRUCTIONS
Start metformin:  500 twice daily for 5 days, then 1,000 mg in morning, 500 mg in evening for 5 days, then 1,000 mg twice daily    Start glimepiride 2 mg daily in the morning    Referrals today to nutrition and diabetic education    Goal fasting blood sugar is     See me back in 4 weeks to see how your blood sugar is doing

## 2019-08-20 ENCOUNTER — APPOINTMENT (OUTPATIENT)
Dept: NUTRITION | Facility: HOSPITAL | Age: 34
End: 2019-08-20

## 2019-10-22 ENCOUNTER — OFFICE VISIT (OUTPATIENT)
Dept: RETAIL CLINIC | Facility: CLINIC | Age: 34
End: 2019-10-22

## 2019-10-22 VITALS
SYSTOLIC BLOOD PRESSURE: 126 MMHG | OXYGEN SATURATION: 98 % | HEART RATE: 101 BPM | TEMPERATURE: 98.4 F | DIASTOLIC BLOOD PRESSURE: 60 MMHG

## 2019-10-22 DIAGNOSIS — R11.2 NON-INTRACTABLE VOMITING WITH NAUSEA, UNSPECIFIED VOMITING TYPE: Primary | ICD-10-CM

## 2019-10-22 DIAGNOSIS — Z02.89 ENCOUNTER TO OBTAIN EXCUSE FROM WORK: ICD-10-CM

## 2019-10-22 PROCEDURE — 99213 OFFICE O/P EST LOW 20 MIN: CPT | Performed by: NURSE PRACTITIONER

## 2019-10-22 NOTE — PATIENT INSTRUCTIONS
"Maintain hydration  - Do not \"chug\" fluids.  Give a few ounces at a time and increase as tolerated  -[place] diet (Dry toast, crackers)  Follow up with PCP if symptoms persist  Go to ER for fever and abdominal pain      Vomiting, Adult  Vomiting occurs when stomach contents are thrown up and out of the mouth. Many people notice nausea before vomiting. Vomiting can make you feel weak and dehydrated. Dehydration can make you tired and thirsty, cause you to have a dry mouth, and decrease how often you urinate. Older adults and people who have other diseases or a weak immune system are at higher risk for dehydration. It is important to treat vomiting as told by your health care provider.  Follow these instructions at home:    Follow your health care provider’s instructions about how to care for yourself at home.  Eating and drinking  Follow these recommendations as told by your health care provider:  · Take an oral rehydration solution (ORS). This is a drink that is sold at pharmacies and retail stores.  · Eat bland, easy-to-digest foods in small amounts as you are able. These foods include bananas, applesauce, rice, lean meats, toast, and crackers.  · Drink clear fluids in small amounts as you are able. Clear fluids include water, ice chips, low-calorie sports drinks, and fruit juice that has water added (diluted fruit juice).  · Avoid fluids that contain a lot of sugar or caffeine.  · Avoid alcohol and foods that are spicy or fatty.    General instructions  · Wash your hands frequently with soap and water. If soap and water are not available, use hand . Make sure that everyone in your household washes their hands frequently.  · Take over-the-counter and prescription medicines only as told by your health care provider.  · Watch your condition for any changes.  · Keep all follow-up visits as told by your health care provider. This is important.  Contact a health care provider if:  · You have a fever.  · You are " not able to keep fluids down.  · Your vomiting gets worse.  · You have new symptoms.  · You feel light-headed or dizzy.  · You have a headache.  · You have muscle cramps.  Get help right away if:  · You have pain in your chest, neck, arm, or jaw.  · You feel extremely weak or you faint.  · You have persistent vomiting.  · You have vomit that is bright red or looks like black coffee grounds.  · You have stools that are bloody or black, or stools that look like tar.  · You have severe pain, cramping, or bloating in your abdomen.  · You have a severe headache, a stiff neck, or both.  · You have a rash.  · You have trouble breathing or you are breathing very quickly.  · Your heart is beating very quickly.  · Your skin feels cold and clammy.  · You feel confused.  · You have pain while urinating.  · You have signs of dehydration, such as:  ? Dark urine, or very little or no urine.  ? Cracked lips.  ? Dry mouth.  ? Sunken eyes.  ? Sleepiness.  ? Weakness.  These symptoms may represent a serious problem that is an emergency. Do not wait to see if the symptoms will go away. Get medical help right away. Call your local emergency services (911 in the [place] Do not drive yourself to the hospital.  This information is not intended to replace advice given to you by your health care provider. Make sure you discuss any questions you have with your health care provider.  Document Released: 01/13/2017 Document Revised: 08/03/2018 Document Reviewed: 08/23/2016  [employer] Interactive Patient Education © 2019 [employer] Inc.

## 2019-10-22 NOTE — PROGRESS NOTES
Subjective   Wyatt Acosta is a 34 y.o. male.     Vomiting    This is a new problem. The current episode started today (Woke at 1am this morning). The problem occurs 2 to 4 times per day (Last emesis around 2:30am). The problem has been gradually improving (Was sent home from work and went to lay down; states he is feeling a whole lot better). Associated symptoms include diarrhea (This morning). Pertinent negatives include no abdominal pain, chest pain or fever. He has tried nothing for the symptoms.    Patient states since being sent home from work he has rested and is feeling a whole lot better.  He still has to obtain a work note which is what he is requesting at this visit today.  He has been able to eat and drink today and states he just ate a small burger while waiting for clinic to open back up from lunch.     The following portions of the patient's history were reviewed and updated as appropriate: allergies, current medications, past family history, past medical history, past social history, past surgical history and problem list.    Review of Systems   Constitutional: Negative for fever.   HENT: Negative for congestion.    Respiratory: Negative for shortness of breath.    Cardiovascular: Negative for chest pain.   Gastrointestinal: Positive for diarrhea (This morning), nausea and vomiting. Negative for abdominal pain.   Genitourinary: Negative for difficulty urinating.   Neurological: Negative for headache.       Objective   Physical Exam   Constitutional: He appears well-developed and well-nourished. He does not appear ill. No distress.   HENT:   Mouth/Throat: Posterior oropharyngeal erythema (mild) present.   Neck: Neck supple.   Cardiovascular: Normal rate, regular rhythm and normal heart sounds. Exam reveals no gallop and no friction rub.   No murmur heard.  Pulmonary/Chest: Effort normal and breath sounds normal. No stridor. No respiratory distress. He has no decreased breath sounds. He has no  "wheezes. He has no rhonchi. He has no rales.   Abdominal: Soft. Bowel sounds are normal. He exhibits no distension. There is no tenderness. There is no guarding.   Lymphadenopathy:     He has no cervical adenopathy.   Neurological: He is alert.   Skin: Skin is warm and dry. He is not diaphoretic.   Psychiatric: He has a normal mood and affect. His behavior is normal. Thought content normal.         Assessment/Plan   Wyatt was seen today for vomiting.    Diagnoses and all orders for this visit:    Non-intractable vomiting with nausea, unspecified vomiting type    Encounter to obtain excuse from work          Maintain hydration  - Do not \"chug\" fluids.  Give a few ounces at a time and increase as tolerated  -[place] diet (Dry toast, crackers)  Follow up with PCP if symptoms persist  Go to ER for fever and abdominal pain  "

## 2021-12-01 ENCOUNTER — HOSPITAL ENCOUNTER (EMERGENCY)
Facility: HOSPITAL | Age: 36
Discharge: HOME | End: 2021-12-02
Attending: EMERGENCY MEDICINE | Admitting: EMERGENCY MEDICINE

## 2021-12-01 DIAGNOSIS — U07.1 COVID-19: Primary | ICD-10-CM

## 2021-12-01 LAB — SARS-COV-2 RNA PNL SPEC NAA+PROBE: DETECTED

## 2021-12-01 PROCEDURE — 87635 SARS-COV-2 COVID-19 AMP PRB: CPT | Performed by: EMERGENCY MEDICINE

## 2021-12-01 PROCEDURE — 25010000002 INJECTION, BAMLANIVIMAB AND ETESEVIMAB, 2100 MG: Performed by: EMERGENCY MEDICINE

## 2021-12-01 PROCEDURE — M0245 HC IV INFUSION, BAMLANIVIMAB AND ETESEVIMAB, 2100 MG: HCPCS | Performed by: EMERGENCY MEDICINE

## 2021-12-01 PROCEDURE — 99283 EMERGENCY DEPT VISIT LOW MDM: CPT

## 2021-12-01 RX ORDER — METHYLPREDNISOLONE SODIUM SUCCINATE 125 MG/2ML
125 INJECTION, POWDER, LYOPHILIZED, FOR SOLUTION INTRAMUSCULAR; INTRAVENOUS ONCE AS NEEDED
Status: DISCONTINUED | OUTPATIENT
Start: 2021-12-01 | End: 2021-12-02 | Stop reason: HOSPADM

## 2021-12-01 RX ORDER — SODIUM CHLORIDE 9 MG/ML
30 INJECTION, SOLUTION INTRAVENOUS ONCE
Status: COMPLETED | OUTPATIENT
Start: 2021-12-01 | End: 2021-12-01

## 2021-12-01 RX ORDER — DIPHENHYDRAMINE HCL 50 MG
50 CAPSULE ORAL ONCE AS NEEDED
Status: DISCONTINUED | OUTPATIENT
Start: 2021-12-01 | End: 2021-12-02 | Stop reason: HOSPADM

## 2021-12-01 RX ORDER — DIPHENHYDRAMINE HYDROCHLORIDE 50 MG/ML
50 INJECTION INTRAMUSCULAR; INTRAVENOUS ONCE AS NEEDED
Status: DISCONTINUED | OUTPATIENT
Start: 2021-12-01 | End: 2021-12-02 | Stop reason: HOSPADM

## 2021-12-01 RX ORDER — EPINEPHRINE 0.3 MG/.3ML
0.3 INJECTION SUBCUTANEOUS ONCE AS NEEDED
Status: DISCONTINUED | OUTPATIENT
Start: 2021-12-01 | End: 2021-12-02 | Stop reason: HOSPADM

## 2021-12-01 RX ADMIN — SODIUM CHLORIDE 30 ML: 9 INJECTION, SOLUTION INTRAVENOUS at 23:19

## 2021-12-01 RX ADMIN — SODIUM CHLORIDE: 9 INJECTION, SOLUTION INTRAVENOUS at 22:53

## 2021-12-02 VITALS
BODY MASS INDEX: 36.6 KG/M2 | OXYGEN SATURATION: 95 % | RESPIRATION RATE: 14 BRPM | TEMPERATURE: 99.3 F | DIASTOLIC BLOOD PRESSURE: 77 MMHG | WEIGHT: 310 LBS | HEIGHT: 77 IN | SYSTOLIC BLOOD PRESSURE: 120 MMHG | HEART RATE: 96 BPM

## 2021-12-02 NOTE — ED PROVIDER NOTES
Subjective   86-year-old male presents to the ER with 5-day history of cough, congestion, sore throat, rhinorrhea, fever, chills and body aches.  He has a history of diabetes.  Reports associated anosmia.  No shortness of breath or difficulty breathing.  He has not had his Covid vaccine.  Symptoms are moderate severity with no aggravating alleviating factors.      History provided by:  Patient      Review of Systems   All other systems reviewed and are negative.      Past Medical History:   Diagnosis Date   • Diabetes mellitus (CMS/[facility])    • MRSA (methicillin resistant Staphylococcus aureus)    • Otitis media        Allergies   Allergen Reactions   • Anectine [Succinylcholine] Paresthesia       Past Surgical History:   Procedure Laterality Date   • INCISION AND DRAINAGE ARM     • MASTOID SURGERY  1986   • TONSILLECTOMY     • TYMPANOSTOMY TUBE PLACEMENT         Family History   Problem Relation Age of Onset   • Stroke Mother    • Diabetes Father    • Stroke Sister    • No Known Problems Brother    • No Known Problems Sister    • Dementia Maternal Grandmother        Social History     Socioeconomic History   • Marital status: Single   Tobacco Use   • Smoking status: Never Smoker   • Smokeless tobacco: Never Used   Substance and Sexual Activity   • Alcohol use: No   • Drug use: No   • Sexual activity: Never           Objective   Physical Exam  Vitals and nursing note reviewed.   Constitutional:       General: He is not in acute distress.     Appearance: Normal appearance. He is normal weight.   HENT:      Head: Normocephalic and atraumatic.      Nose: Congestion and rhinorrhea present.      Mouth/Throat:      Mouth: Mucous membranes are moist.      Pharynx: Posterior oropharyngeal erythema present. No oropharyngeal exudate.   Eyes:      Extraocular Movements: Extraocular movements intact.      Conjunctiva/sclera: Conjunctivae normal.      Pupils: Pupils are equal, round, and reactive to light.   Cardiovascular:       Rate and Rhythm: Normal rate and regular rhythm.      Pulses: Normal pulses.      Heart sounds: Normal heart sounds.   Pulmonary:      Effort: Pulmonary effort is normal.      Breath sounds: Normal breath sounds. No wheezing, rhonchi or rales.   Abdominal:      General: Abdomen is flat. Bowel sounds are normal. There is no distension.      Palpations: Abdomen is soft.      Tenderness: There is no abdominal tenderness.   Musculoskeletal:         General: No tenderness. Normal range of motion.      Cervical back: Normal range of motion and neck supple. No rigidity. No muscular tenderness.      Right lower leg: No edema.      Left lower leg: No edema.   Skin:     General: Skin is warm and dry.      Capillary Refill: Capillary refill takes less than 2 seconds.      Findings: No rash.   Neurological:      General: No focal deficit present.      Mental Status: He is alert and oriented to person, place, and time. Mental status is at baseline.      Cranial Nerves: No cranial nerve deficit.      Sensory: No sensory deficit.      Motor: No weakness.   Psychiatric:         Mood and Affect: Mood normal.         Behavior: Behavior normal.         Thought Content: Thought content normal.         Procedures         Lab Results (last 24 hours)     Procedure Component Value Units Date/Time    COVID PRE-OP / PRE-PROCEDURE SCREENING ORDER (NO ISOLATION) - Swab, Nasal Cavity [[id number]]  (Abnormal) Collected: 12/01/21 2026    Specimen: Swab from Nasal Cavity Updated: 12/01/21 2115    Narrative:      The following orders were created for panel order COVID PRE-OP / PRE-PROCEDURE SCREENING ORDER (NO ISOLATION) - Swab, Nasal Cavity.  Procedure                               Abnormality         Status                     ---------                               -----------         ------                     COVID-19,[facility]...[[id number]]  Abnormal            Final result                 Please view results for these tests on the  individual orders.    COVID-19,[facility] IN-HOUSE,Nasal Swab No Transport Media 3-4 HR TAT - Swab, Nasal Cavity [[id number]]  (Abnormal) Collected: 12/01/21 2026    Specimen: Swab from Nasal Cavity Updated: 12/01/21 2115     COVID19 Detected    Narrative:      Fact sheet for providers: https://www.fda.gov/media/349455/download     Fact sheet for patients: https://www.fda.gov/media/692643/download    Test performed by PCR.    Consider negative results in combination with clinical observations, patient history, and epidemiological information.        ED Course  ED Course as of 12/01/21 2224   Wed Dec 01, 2021   2219 36-year-old male with COVID-19. Lungs are clear on exam, sats are normal. Covid was detected. BMI is 36 and he has diabetes, high risk for serious disease and met criteria for Monocal antibody fusion. Patient verbally consented to receiving monoclonal antibody infusion. Will discharge home with pulse oximeter and instruct patient return for sats below 92%. [AW]      ED Course User Index  [AW] Chapincito Pruett MD                                                 MDM  Number of Diagnoses or Management Options  COVID-19: new and requires workup     Amount and/or Complexity of Data Reviewed  Clinical lab tests: reviewed    Risk of Complications, Morbidity, and/or Mortality  Presenting problems: high  Diagnostic procedures: high  Management options: high    Patient Progress  Patient progress: stable      Final diagnoses:   COVID-19       ED Disposition  ED Disposition     ED Disposition Condition Comment    Discharge Stable             Follow-up with your primary doctor, if you do not have a primary doctor you can follow-up with a doctor from the list provided below.             Medication List      No changes were made to your prescriptions during this visit.          Chapincito Pruett MD  12/01/21 2225

## 2021-12-02 NOTE — ED TRIAGE NOTES
Patient states he started feeling bad on Monday and sent home but since then he has worsened. Patient c/o body aches, fever, cough, congested. Patient states he doesn't know of any known exposure and is not vaccinated.

## 2022-02-17 ENCOUNTER — LAB (OUTPATIENT)
Dept: LAB | Facility: HOSPITAL | Age: 37
End: 2022-02-17

## 2022-02-17 ENCOUNTER — OFFICE VISIT (OUTPATIENT)
Dept: INTERNAL MEDICINE | Facility: CLINIC | Age: 37
End: 2022-02-17

## 2022-02-17 VITALS
DIASTOLIC BLOOD PRESSURE: 74 MMHG | BODY MASS INDEX: 36.95 KG/M2 | HEIGHT: 77 IN | SYSTOLIC BLOOD PRESSURE: 144 MMHG | OXYGEN SATURATION: 99 % | HEART RATE: 90 BPM | WEIGHT: 312.9 LBS

## 2022-02-17 DIAGNOSIS — E11.8 TYPE 2 DIABETES MELLITUS WITH COMPLICATION: Primary | ICD-10-CM

## 2022-02-17 DIAGNOSIS — R53.83 FATIGUE, UNSPECIFIED TYPE: ICD-10-CM

## 2022-02-17 DIAGNOSIS — M79.672 BILATERAL FOOT PAIN: ICD-10-CM

## 2022-02-17 DIAGNOSIS — E78.1 PURE HYPERTRIGLYCERIDEMIA: ICD-10-CM

## 2022-02-17 DIAGNOSIS — E66.01 MORBID (SEVERE) OBESITY DUE TO EXCESS CALORIES: ICD-10-CM

## 2022-02-17 DIAGNOSIS — M79.671 BILATERAL FOOT PAIN: ICD-10-CM

## 2022-02-17 DIAGNOSIS — E11.8 TYPE 2 DIABETES MELLITUS WITH COMPLICATION: ICD-10-CM

## 2022-02-17 DIAGNOSIS — L98.9 SKIN LESION OF FOOT: ICD-10-CM

## 2022-02-17 PROBLEM — U07.1 COVID-19: Status: ACTIVE | Noted: 2022-02-17

## 2022-02-17 LAB
ALBUMIN SERPL-MCNC: 4.4 G/DL (ref 3.5–5.2)
ALBUMIN/GLOB SERPL: 1.7 G/DL
ALP SERPL-CCNC: 119 U/L (ref 39–117)
ALT SERPL W P-5'-P-CCNC: 28 U/L (ref 1–41)
ANION GAP SERPL CALCULATED.3IONS-SCNC: 10 MMOL/L (ref 5–15)
AST SERPL-CCNC: 21 U/L (ref 1–40)
BASOPHILS # BLD AUTO: 0.03 10*3/MM3 (ref 0–0.2)
BASOPHILS NFR BLD AUTO: 0.5 % (ref 0–1.5)
BILIRUB SERPL-MCNC: 0.4 MG/DL (ref 0–1.2)
BUN SERPL-MCNC: 11 MG/DL (ref 6–20)
BUN/CREAT SERPL: 15.1 (ref 7–25)
CALCIUM SPEC-SCNC: 9 MG/DL (ref 8.6–10.5)
CHLORIDE SERPL-SCNC: 100 MMOL/L (ref 98–107)
CHOLEST SERPL-MCNC: 197 MG/DL (ref 0–200)
CO2 SERPL-SCNC: 28 MMOL/L (ref 22–29)
CREAT SERPL-MCNC: 0.73 MG/DL (ref 0.76–1.27)
DEPRECATED RDW RBC AUTO: 34.4 FL (ref 37–54)
EOSINOPHIL # BLD AUTO: 0.14 10*3/MM3 (ref 0–0.4)
EOSINOPHIL NFR BLD AUTO: 2.1 % (ref 0.3–6.2)
ERYTHROCYTE [DISTWIDTH] IN BLOOD BY AUTOMATED COUNT: 12.5 % (ref 12.3–15.4)
GFR SERPL CREATININE-BSD FRML MDRD: 122 ML/MIN/1.73
GLOBULIN UR ELPH-MCNC: 2.6 GM/DL
GLUCOSE SERPL-MCNC: 264 MG/DL (ref 65–99)
HBA1C MFR BLD: 10 % (ref 4.8–5.6)
HCT VFR BLD AUTO: 43.9 % (ref 37.5–51)
HDLC SERPL-MCNC: 39 MG/DL (ref 40–60)
HGB BLD-MCNC: 15.2 G/DL (ref 13–17.7)
IMM GRANULOCYTES # BLD AUTO: 0.02 10*3/MM3 (ref 0–0.05)
IMM GRANULOCYTES NFR BLD AUTO: 0.3 % (ref 0–0.5)
LDLC SERPL CALC-MCNC: 126 MG/DL (ref 0–100)
LDLC/HDLC SERPL: 3.14 {RATIO}
LYMPHOCYTES # BLD AUTO: 1.88 10*3/MM3 (ref 0.7–3.1)
LYMPHOCYTES NFR BLD AUTO: 28.5 % (ref 19.6–45.3)
MCH RBC QN AUTO: 26.5 PG (ref 26.6–33)
MCHC RBC AUTO-ENTMCNC: 34.6 G/DL (ref 31.5–35.7)
MCV RBC AUTO: 76.6 FL (ref 79–97)
MONOCYTES # BLD AUTO: 0.52 10*3/MM3 (ref 0.1–0.9)
MONOCYTES NFR BLD AUTO: 7.9 % (ref 5–12)
NEUTROPHILS NFR BLD AUTO: 4 10*3/MM3 (ref 1.7–7)
NEUTROPHILS NFR BLD AUTO: 60.7 % (ref 42.7–76)
NRBC BLD AUTO-RTO: 0 /100 WBC (ref 0–0.2)
PLATELET # BLD AUTO: 270 10*3/MM3 (ref 140–450)
PMV BLD AUTO: 9.8 FL (ref 6–12)
POTASSIUM SERPL-SCNC: 4 MMOL/L (ref 3.5–5.2)
PROT SERPL-MCNC: 7 G/DL (ref 6–8.5)
RBC # BLD AUTO: 5.73 10*6/MM3 (ref 4.14–5.8)
SODIUM SERPL-SCNC: 138 MMOL/L (ref 136–145)
TRIGL SERPL-MCNC: 178 MG/DL (ref 0–150)
TSH SERPL DL<=0.05 MIU/L-ACNC: 1.44 UIU/ML (ref 0.27–4.2)
VLDLC SERPL-MCNC: 32 MG/DL (ref 5–40)
WBC NRBC COR # BLD: 6.59 10*3/MM3 (ref 3.4–10.8)

## 2022-02-17 PROCEDURE — 83036 HEMOGLOBIN GLYCOSYLATED A1C: CPT

## 2022-02-17 PROCEDURE — 99214 OFFICE O/P EST MOD 30 MIN: CPT | Performed by: NURSE PRACTITIONER

## 2022-02-17 PROCEDURE — 36415 COLL VENOUS BLD VENIPUNCTURE: CPT

## 2022-02-17 PROCEDURE — 80050 GENERAL HEALTH PANEL: CPT

## 2022-02-17 PROCEDURE — 80061 LIPID PANEL: CPT

## 2022-02-17 PROCEDURE — 82306 VITAMIN D 25 HYDROXY: CPT

## 2022-02-17 RX ORDER — LANCETS 28 GAUGE
EACH MISCELLANEOUS
Qty: 100 EACH | Refills: 12 | Status: SHIPPED | OUTPATIENT
Start: 2022-02-17

## 2022-02-17 NOTE — PROGRESS NOTES
"Chief Complaint   Patient presents with   • Establish Care   • Blood Sugar Problem     pt states he needs to get back on his medicaiton   • Foot Pain     \" i think it from diabetes\"         History:  Wyatt Acosta is a 36 y.o. male who presents today for evaluation of the above problems.          Here to establish care with PCP.    6 years ago diagnosed with diabetes. Was on metformin and glyburide.  Has been off meds about 2 years.  Doesn't check blood sugar.  Was seen in Urgent Care for sinus problems, and Pooja Mccauley refilled his metformin, but he hasn't started it yet; just wanted to be sure that's what he should do.  Glucose on 2/12/22 was 245. Hasn't noticed excessive thirst or excessive urination.    Drinks only water. On diet at work. Used to weigh over 400 pounds 2 years ago. 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    Both feet have burning pains in them, along the soles. His father has neuropathy, and he is concerned he is developing this.      He has had high triglycerides in the past.    He stays fatigued. No chest pain.        ROS:  Review of Systems  As above    Allergies   Allergen Reactions   • Anectine [Succinylcholine] Paresthesia     Past Medical History:   Diagnosis Date   • Diabetes mellitus (HCC)    • MRSA (methicillin resistant Staphylococcus aureus)    • Otitis media      Past Surgical History:   Procedure Laterality Date   • INCISION AND DRAINAGE ARM     • MASTOID SURGERY  1986   • TONSILLECTOMY     • TYMPANOSTOMY TUBE PLACEMENT       Family History   Problem Relation Age of Onset   • Stroke Mother    • Diabetes Father    • Stroke Sister    • Diabetes Sister    • No Known Problems Brother    • No Known Problems Sister    • Dementia Maternal Grandmother      Wyatt  reports that he has quit smoking. He has never used smokeless tobacco. He reports that he does not drink alcohol and does not use drugs.    I have reviewed and updated the above documentation (if necessary) " "including but not limited to chief complaint, ROS, PFSH, allergies and medications        Current Outpatient Medications:   •  Blood Glucose Monitoring Suppl (Easy Step Glucose Monitor) w/Device kit, 1 each 2 (Two) Times a Day As Needed (blood sugar). Indications: can be any brand, Disp: 1 kit, Rfl: 0  •  glucose blood test strip, Use as instructed, Disp: 100 each, Rfl: 12  •  Lancets (freestyle) lancets, Use twice daily as needed to check glucose, Disp: 100 each, Rfl: 12  •  metFORMIN (Glucophage) 500 MG tablet, Take 1 tablet by mouth 2 (Two) Times a Day With Meals., Disp: 60 tablet, Rfl: 5    OBJECTIVE:  Visit Vitals  /74 (BP Location: Left arm, Patient Position: Sitting, Cuff Size: Adult)   Pulse 90   Ht 195.6 cm (77\")   Wt (!) 142 kg (312 lb 14.4 oz)   SpO2 99%   BMI 37.10 kg/m²      Physical Exam  Vitals and nursing note reviewed.   Constitutional:       General: He is not in acute distress.     Appearance: Normal appearance. He is not ill-appearing, toxic-appearing or diaphoretic.   HENT:      Head: Normocephalic and atraumatic.   Eyes:      General: No scleral icterus.        Right eye: No discharge.         Left eye: No discharge.   Neck:      Vascular: No carotid bruit.      Comments: No thyromegaly or mass appreciated.  Cardiovascular:      Rate and Rhythm: Normal rate and regular rhythm.      Heart sounds: Normal heart sounds.   Pulmonary:      Effort: Pulmonary effort is normal. No respiratory distress.      Breath sounds: Normal breath sounds. No wheezing.   Abdominal:      General: There is no distension.      Palpations: Abdomen is soft. There is no mass.      Tenderness: There is no abdominal tenderness.   Musculoskeletal:      Cervical back: No tenderness.      Right lower leg: No edema.      Left lower leg: No edema.        Feet:    Feet:      Comments: 3 mm long oval dark brown skin pigment sole right foot.  Lymphadenopathy:      Cervical: No cervical adenopathy.   Skin:     General: Skin is " warm and dry.      Comments: Multiple skin tags on neck and chest, and several nevi, some noted to be very dark brown.   Neurological:      Mental Status: He is alert and oriented to person, place, and time.   Psychiatric:         Mood and Affect: Mood normal.         Behavior: Behavior normal.         Thought Content: Thought content normal.         Judgment: Judgment normal.         [facility]    Assessment/Plan    Diagnoses and all orders for this visit:    1. Type 2 diabetes mellitus with complication (HCC) (Primary)  -     Comprehensive metabolic panel; Future  -     Hemoglobin A1c; Future  -     metFORMIN (Glucophage) 500 MG tablet; Take 1 tablet by mouth 2 (Two) Times a Day With Meals.  Dispense: 60 tablet; Refill: 5  -     Blood Glucose Monitoring Suppl (Easy Step Glucose Monitor) w/Device kit; 1 each 2 (Two) Times a Day As Needed (blood sugar). Indications: can be any brand  Dispense: 1 kit; Refill: 0  -     Lancets (freestyle) lancets; Use twice daily as needed to check glucose  Dispense: 100 each; Refill: 12  -     glucose blood test strip; Use as instructed  Dispense: 100 each; Refill: 12    2. Morbid (severe) obesity due to excess calories (HCC)  -     TSH; Future    3. Skin lesion of foot  -     Ambulatory Referral to Dermatology    4. Fatigue, unspecified type  -     Vitamin D 25 hydroxy; Future  -     CBC w AUTO Differential; Future    5. Pure hypertriglyceridemia  -     Lipid panel; Future    6. Bilateral foot pain    I do feel with the history of Type 2 diabetes, previously treated with oral medications, and the recent lab value of glucose being 245, we need to re-initiate metformin 500 mg BID.  Will also check labs above.  His foot pain may be neuropathy related to uncontrolled diabetes.  Will see if it improves with better diabetic control to start.      The fatigue may also be related to elevated glucose but will check TSH, CBC, and Vitamin D for that complaint as well.      Follow up on lipids that  have been abnormal in the past.    Patient's Body mass index is 37.1 kg/m². indicating that he is morbidly obese (BMI > 40 or > 35 with obesity - related health condition). Obesity-related health conditions include the following: diabetes mellitus and dyslipidemias. Obesity is newly identified. BMI is is above average; BMI management plan is completed. We discussed low calorie, low carb based diet program, portion control and increasing exercise..    The dark spot on the sole of the right foot is likely hyperpigmentation; however, would like to have it checked due to being on sole of foot. He has several skin tags and dark nevi noted, so will be a good idea to have an overall skin check by dermatology as well.    Education materials and an After Visit Summary were printed and given to the patient at discharge.  Return in about 3 months (around 5/17/2022) for  , Annual physical with Dr. Mendes .         Pau Mckeon, MARGARITA   15:28 CST  2/18/2022

## 2022-02-18 DIAGNOSIS — E55.9 VITAMIN D DEFICIENCY: Primary | ICD-10-CM

## 2022-02-18 PROBLEM — E78.5 HYPERLIPIDEMIA: Status: ACTIVE | Noted: 2022-02-18

## 2022-02-18 LAB — 25(OH)D3 SERPL-MCNC: 6.4 NG/ML

## 2022-02-18 RX ORDER — ERGOCALCIFEROL 1.25 MG/1
50000 CAPSULE ORAL WEEKLY
Qty: 12 CAPSULE | Refills: 0 | Status: SHIPPED | OUTPATIENT
Start: 2022-02-18

## 2022-02-23 ENCOUNTER — TELEPHONE (OUTPATIENT)
Dept: INTERNAL MEDICINE | Facility: CLINIC | Age: 37
End: 2022-02-23

## 2022-02-23 NOTE — TELEPHONE ENCOUNTER
Pharmacy Name: [id number] - [place] - [address] AT [facility] [address] - [phone]  - [phone]      Pharmacy representative phone number: [phone]    What medication are you calling in regards to:    glucose blood test strip         What question does the pharmacy have: PHARMACY REQUESTING DIRECTIONS FOR TESTING, SPECIFICALLY HOW MANY TIMES DAILY     Who is the provider that prescribed the medication: TIFFANY MENDIOLA
